# Patient Record
Sex: MALE | Race: WHITE | NOT HISPANIC OR LATINO | Employment: OTHER | URBAN - METROPOLITAN AREA
[De-identification: names, ages, dates, MRNs, and addresses within clinical notes are randomized per-mention and may not be internally consistent; named-entity substitution may affect disease eponyms.]

---

## 2022-03-31 ENCOUNTER — OFFICE VISIT (OUTPATIENT)
Dept: ORTHOPEDIC SURGERY | Facility: CLINIC | Age: 72
End: 2022-03-31

## 2022-03-31 VITALS — BODY MASS INDEX: 28.71 KG/M2 | TEMPERATURE: 96.7 F | HEIGHT: 72 IN | WEIGHT: 212 LBS

## 2022-03-31 DIAGNOSIS — R52 PAIN: Primary | ICD-10-CM

## 2022-03-31 DIAGNOSIS — M17.0 PRIMARY OSTEOARTHRITIS OF BOTH KNEES: ICD-10-CM

## 2022-03-31 PROCEDURE — 99204 OFFICE O/P NEW MOD 45 MIN: CPT | Performed by: ORTHOPAEDIC SURGERY

## 2022-03-31 PROCEDURE — 73562 X-RAY EXAM OF KNEE 3: CPT | Performed by: ORTHOPAEDIC SURGERY

## 2022-03-31 RX ORDER — AMLODIPINE BESYLATE 5 MG/1
5 TABLET ORAL DAILY
COMMUNITY
Start: 2021-12-27

## 2022-03-31 RX ORDER — MELOXICAM 15 MG/1
TABLET ORAL
Qty: 30 TABLET | Refills: 1 | Status: SHIPPED | OUTPATIENT
Start: 2022-03-31 | End: 2022-09-08 | Stop reason: HOSPADM

## 2022-03-31 NOTE — PROGRESS NOTES
"willowPatient: Everton Murguia  YOB: 1950 72 y.o. male  Medical Record Number: [id number]    Chief Complaints:   Chief Complaint   Patient presents with   • Left Knee - Establish Care   • Right Knee - Establish Care       History of Present Illness:Everton Murguia is a 72 y.o. male who presents with right greater than left knee pain which has been present for many years.  He has had previous steroid injections a few years ago and he states that he had essentially no relief from the steroid injections.  Unfortunately his pain is worsened.  He can only walk very short distances less than one half block.  He has a stabbing pain on the medial aspect of both knees.    Allergies: No Known Allergies    Medications:   Current Outpatient Medications   Medication Sig Dispense Refill   • amLODIPine (NORVASC) 5 MG tablet      • metFORMIN (GLUCOPHAGE) 500 MG tablet        No current facility-administered medications for this visit.         The following portions of the patient's history were reviewed and updated as appropriate: allergies, current medications, past family history, past medical history, past social history, past surgical history and problem list.    Review of Systems:   A 14 point review of systems was performed. All systems negative except pertinent positives/negative listed in HPI above    Physical Exam:   Vitals:    03/31/22 0905   Temp: 96.7 °F (35.9 °C)   Weight: 96.2 kg (212 lb)   Height: 182.9 cm (72\")       General: A and O x 3, ASA, NAD    SCLERA:    Normal    DENTITION:   Normal     Knee:  bilateral    ALIGNMENT:     Varus  ,   Patella  tracks  midline    GAIT:    Antalgic    SKIN:    No abnormality    RANGE OF MOTION:   5-110   DEG    STRENGTH:   4  / 5    LIGAMENTS:    No varus / valgus instability.   Negative  Lachman.    MENISCUS:     Negative   Li       DISTAL PULSES:    Paplable    DISTAL SENSATION :   Intact    LYMPHATICS:     No   lymphadenopathy    OTHER:          - Positive   " effusion      - Crepitance with ROM         Radiology:  Xrays 3views bilateral knees (ap,lateral, sunrise) were ordered and reviewed for evaluation of knee pain demonstrating advanced varus osteoarthritis with bone on bone articulation, subchondral cysts, and periarticular osteophytes.  The right is worse than the left.  There are no previous films for comparison.    Assessment/Plan: Severe right greater than left knee end-stage osteoarthritis.  Previous injections have not helped and he is diabetic so we would avoid any further steroid injections.  I will send him to physical therapy for quad and hip strengthening given his gait abnormalities.  I recommended the use a cane that he has at home in the interim.  He has severe end-stage arthritis of the likely need to proceed with knee replacement of the right knee first.  I am sending him back to physical therapy and I will recheck him in 6 weeks.  Also called in a prescription for meloxicam      Heriberto Rodríguez MD  3/31/2022

## 2022-05-12 ENCOUNTER — OFFICE VISIT (OUTPATIENT)
Dept: ORTHOPEDIC SURGERY | Facility: CLINIC | Age: 72
End: 2022-05-12

## 2022-05-12 VITALS — BODY MASS INDEX: 26.82 KG/M2 | TEMPERATURE: 96.4 F | WEIGHT: 209 LBS | HEIGHT: 74 IN | RESPIRATION RATE: 18 BRPM

## 2022-05-12 DIAGNOSIS — M17.11 PRIMARY OSTEOARTHRITIS OF RIGHT KNEE: Primary | ICD-10-CM

## 2022-05-12 DIAGNOSIS — E11.9 TYPE 2 DIABETES MELLITUS WITHOUT COMPLICATION, WITHOUT LONG-TERM CURRENT USE OF INSULIN: ICD-10-CM

## 2022-05-12 DIAGNOSIS — M17.12 PRIMARY OSTEOARTHRITIS OF LEFT KNEE: ICD-10-CM

## 2022-05-12 PROCEDURE — 99214 OFFICE O/P EST MOD 30 MIN: CPT | Performed by: ORTHOPAEDIC SURGERY

## 2022-05-12 RX ORDER — PREGABALIN 75 MG/1
150 CAPSULE ORAL ONCE
Status: CANCELLED | OUTPATIENT
Start: 2022-09-07 | End: 2022-05-12

## 2022-05-12 RX ORDER — CHLORHEXIDINE GLUCONATE 500 MG/1
CLOTH TOPICAL 2 TIMES DAILY
Status: CANCELLED | OUTPATIENT
Start: 2022-05-12

## 2022-05-12 RX ORDER — POVIDONE-IODINE 10 MG/ML
SOLUTION TOPICAL ONCE
Status: CANCELLED | OUTPATIENT
Start: 2022-09-07 | End: 2022-05-12

## 2022-05-12 RX ORDER — CEFAZOLIN SODIUM 2 G/100ML
2 INJECTION, SOLUTION INTRAVENOUS ONCE
Status: CANCELLED | OUTPATIENT
Start: 2022-09-07 | End: 2022-05-12

## 2022-05-12 RX ORDER — MELOXICAM 15 MG/1
15 TABLET ORAL ONCE
Status: CANCELLED | OUTPATIENT
Start: 2022-09-07 | End: 2022-05-12

## 2022-05-12 NOTE — PROGRESS NOTES
"Patient: Everton Murguia  YOB: 1950 72 y.o. male  Medical Record Number: [id number]    Chief Complaints:   Chief Complaint   Patient presents with   • Left Knee - Follow-up   • Right Knee - Follow-up       History of Present Illness:Everton Murguia is a 72 y.o. male who presents for follow-up of severe bilateral medial knee pain.  He rates it as a 10 out of 10.  Injections meloxicam therapy exercises have not helped to control his symptoms.  He is limited in basic activities of daily living and can only walk short distances.    Allergies: No Known Allergies    Medications:   Current Outpatient Medications   Medication Sig Dispense Refill   • amLODIPine (NORVASC) 5 MG tablet      • meloxicam (MOBIC) 15 MG tablet 1 PO Daily with food. 30 tablet 1   • metFORMIN (GLUCOPHAGE) 500 MG tablet        No current facility-administered medications for this visit.         The following portions of the patient's history were reviewed and updated as appropriate: allergies, current medications, past family history, past medical history, past social history, past surgical history and problem list.    Review of Systems:   A 14 point review of systems was performed. All systems negative except pertinent positives/negative listed in HPI above    Physical Exam:   Vitals:    05/12/22 1533   Resp: 18   Temp: 96.4 °F (35.8 °C)   Weight: 94.8 kg (209 lb)   Height: 188 cm (74\")       General: A and O x 3, ASA, NAD    SCLERA:    Normal    DENTITION:   Normal  Knee:  bilateral    ALIGNMENT:     Varus  ,   Patella  tracks  midline    GAIT:    Antalgic    SKIN:    No abnormality    RANGE OF MOTION:   5  -  110   DEG    STRENGTH:   4  / 5    LIGAMENTS:    No varus / valgus instability.   Negative  Lachman.    MENISCUS:     Negative   Li       DISTAL PULSES:    Paplable    DISTAL SENSATION :   Intact    LYMPHATICS:     No   lymphadenopathy    OTHER:          - Positive   effusion      - Crepitance with ROM       Radiology:  Xrays " 3views both knees (ap,lateral, sunrise) taken previously demonstratingadvanced varus osteoarthritis with bone on bone articulation, subchondral cysts, and periarticular osteophytes  todays xrays were compared to previous xrays and demonstrate no change    Assessment/Plan:  Severe bilateral knee varus osteoarthritis.  Injections have given him no relief.  I will have him return to therapy again.  Meloxicam is not working.  He is diabetic his last hemoglobin A1c was 5.  Would like to hold off any further injections given the lack of response and his diabetes.  Continuation of conservative management vs. TKA discussed.  The patient wishes to proceed with total knee replacement.  At this point the patient has failed the full compliment of conservative treatment and stating complete understanding of the risks/benefits/ anternatives wishes to proceed with surgical treatment.    Risk and benefits of surgery were reviewed.  Including, but not limited to, blood clots or pulmonary embolism, anesthesia risk, infection, fracture, skin/leg numbness, persistent pain/crepitance/popping/catching, failure of the implant, need for future surgeries, hematoma, possible nerve or blood vessel injury, need for transfusion, and potential risk of stroke,heart attack or death, among others.  The patient understands and wishes to proceed.     It was explained that if tissue has been repaired or reconstructed, there is also an increased chance of failure which may require further management.  Following the completion of the discussion, the patient expressed understanding of this planned course of care, all their questions were answered and consent will be obtained preoperatively.    Operative Plan: Left [employer] Total Knee Replacement an overnight staywith home health rehab

## 2022-05-16 PROBLEM — M17.12 PRIMARY OSTEOARTHRITIS OF LEFT KNEE: Status: ACTIVE | Noted: 2022-05-16

## 2022-06-07 ENCOUNTER — TREATMENT (OUTPATIENT)
Dept: PHYSICAL THERAPY | Facility: CLINIC | Age: 72
End: 2022-06-07

## 2022-06-07 DIAGNOSIS — Z74.09 IMPAIRED FUNCTIONAL MOBILITY AND ACTIVITY TOLERANCE: ICD-10-CM

## 2022-06-07 DIAGNOSIS — R26.9 GAIT DISTURBANCE: ICD-10-CM

## 2022-06-07 DIAGNOSIS — G89.29 CHRONIC PAIN OF LEFT KNEE: Primary | ICD-10-CM

## 2022-06-07 DIAGNOSIS — M17.12 PRIMARY OSTEOARTHRITIS OF LEFT KNEE: ICD-10-CM

## 2022-06-07 DIAGNOSIS — M25.562 CHRONIC PAIN OF LEFT KNEE: Primary | ICD-10-CM

## 2022-06-07 PROCEDURE — 97530 THERAPEUTIC ACTIVITIES: CPT | Performed by: PHYSICAL THERAPIST

## 2022-06-07 PROCEDURE — 97110 THERAPEUTIC EXERCISES: CPT | Performed by: PHYSICAL THERAPIST

## 2022-06-07 PROCEDURE — 97161 PT EVAL LOW COMPLEX 20 MIN: CPT | Performed by: PHYSICAL THERAPIST

## 2022-06-07 NOTE — PROGRESS NOTES
Physical Therapy Initial Evaluation and Plan of Care    Patient: Everton Murguia   : 1950  Diagnosis/ICD-10 Code:  Chronic pain of left knee [M25.562, G89.29]  Referring practitioner: Heriberto Rodríguez MD  Date of Initial Visit: 2022  Today's Date: 2022  Patient seen for 1 session         Visit Diagnoses:    ICD-10-CM ICD-9-CM   1. Chronic pain of left knee  M25.562 719.46    G89.29 338.29   2. Primary osteoarthritis of left knee  M17.12 715.16   3. Gait disturbance  R26.9 781.2   4. Impaired functional mobility and activity tolerance  Z74.09 V49.89         Subjective Questionnaire: LEFS: 40      Subjective Evaluation    History of Present Illness  Mechanism of injury: No specific EDY - B knees OA over time - now advanced.  To have TKA Left in September.  Tried meloxicam and injections in past.   Other med Hx - type II DM - on insulin      Patient Occupation: retired   Precautions and Work Restrictions: NAPain  Current pain ratin  At worst pain ratin  Location: medial knees  Quality: sharp  Relieving factors: change in position and rest  Aggravating factors: ambulation, stairs, squatting and prolonged positioning  Progression: worsening    Social Support  Lives in: multiple-level home  Lives with: spouse    Diagnostic Tests  X-ray: abnormal    Treatments  Previous treatment: medication and injection treatment  Current treatment: physical therapy  Patient Goals  Patient goals for therapy: decreased pain  Patient goal: prepare for TKA           Objective          Observations     Additional Knee Observation Details  B Genu varum; supinated left ankle    Tenderness   Left Knee   Tenderness in the medial joint line and medial retinaculum.     Right Knee   Tenderness in the medial joint line and medial retinaculum.     Active Range of Motion   Left Knee   Flexion: 123 degrees     Right Knee   Flexion: 123 degrees     Additional Active Range of Motion Details  Lacks full extension slightly both  knees    Patellar Mobility   Left Knee Hypomobile in the left medial, left lateral, left superior and left inferior patellar tendon(s).     Right Knee Hypomobile in the medial, lateral, superior and inferior patellar tendon(s).     Additional Patellar Mobility Details  Crepitus L>R    Strength/Myotome Testing     Left Hip   Planes of Motion   Flexion: 5  Abduction: 5 (seated)  Adduction: 5  External rotation: 5    Right Hip   Planes of Motion   Flexion: 5  Abduction: 5  Adduction: 5  External rotation: 5    Left Knee   Flexion: 5  Extension: 5    Right Knee   Flexion: 5  Extension: 5    Left Ankle/Foot   Dorsiflexion: 4  Inversion: 4+  Eversion: 4-    Right Ankle/Foot   Dorsiflexion: 4+  Inversion: 4+  Eversion: 4    Tests     Additional Tests Details  Tight B HS, gastroc; very tight B quad and hip flexors    Ambulation     Ambulation: Level Surfaces   Ambulation without assistive device: independent    Additional Level Surfaces Ambulation Details  Mod antalgia - increased lateral sway          Assessment & Plan     Assessment  Impairments: abnormal gait, abnormal or restricted ROM, activity intolerance, impaired physical strength, lacks appropriate home exercise program and pain with function  Functional Limitations: walking, standing and stooping  Assessment details: 71 yo M with Dx of end-stage OA B knees and plans for left TKA later this year. Presents with good strength and fair ROM at knee but with notable mm tightness and with moderate ankle weakness.  Could benefit from PT to improve flexibility in LEs and strength/stability in ankles for improvements in pain and function now and for improved success with eventual TKA.  Prognosis details: Good for stated goals    Goals  Plan Goals: STGs x 2 wks  1. Increasing knee ROM and flexibility and ankle strength for improved ADLs  2. Pain with ex and ADLs < 6/10  3. Review body mechanics and joint protection principles with ADLs  4. Ambulates level surface 300 ft and  4 inch steps with min to no antalgia    LTGs x 6 wks  1. Improved flexibility for decreased pain with ADLs  2. Improved ankle strength by 1/2 grade or >  3. Ambulates 5 min and 6-8 inch steps with min antalgia  4. Independent with HEP and joint protection principles    Plan  Therapy options: will be seen for skilled therapy services  Planned modality interventions: cryotherapy  Planned therapy interventions: neuromuscular re-education, therapeutic activities, stretching, strengthening, home exercise program, gait training and flexibility  Frequency: 1x week  Duration in weeks: 6  Treatment plan discussed with: patient        History # of Personal Factors and/or Comorbidities: MODERATE (1-2)  Examination of Body System(s): # of elements: LOW (1-2)  Clinical Presentation: STABLE   Clinical Decision Making: LOW       Timed:         Manual Therapy:    0     mins  03156;     Therapeutic Exercise:    18     mins  03966;     Neuromuscular Alicia:    0    mins  61052;    Therapeutic Activity:     12     mins  40679;     Gait Trainin     mins  43011;     Ultrasound:     0     mins  29868;    Ionto                               00    mins   79812  Self Care                       0     mins   65979  Canalith Repos    0     mins 11907      Un-Timed:  Electrical Stimulation:    0     mins  36847 ( );  Dry Needling     0     mins self-pay  Traction     0     mins 49708  Low Eval     25     Mins  40458  Mod Eval     0     Mins  24767  High Eval                       0     Mins  93943        Timed Treatment:   30   mins   Total Treatment:     55   mins          PT: Myriam Aquino PT     License Number: [id number]  Electronically signed by Myriam Aquino PT, 22, 7:38 AM EDT    Certification Period: 2022 thru 2022  I certify that the therapy services are furnished while this patient is under my care.  The services outlined above are required by this patient, and will be reviewed every   days.         Physician Signature:__________________________________________________    PHYSICIAN: Heriberto Rodríguez MD  NPI: [id number]                                      DATE:      Please sign and return via fax to [email or web address] . Thank you, [facility].

## 2022-06-15 ENCOUNTER — TREATMENT (OUTPATIENT)
Dept: PHYSICAL THERAPY | Facility: CLINIC | Age: 72
End: 2022-06-15

## 2022-06-15 DIAGNOSIS — Z74.09 IMPAIRED FUNCTIONAL MOBILITY AND ACTIVITY TOLERANCE: ICD-10-CM

## 2022-06-15 DIAGNOSIS — M25.562 CHRONIC PAIN OF LEFT KNEE: Primary | ICD-10-CM

## 2022-06-15 DIAGNOSIS — G89.29 CHRONIC PAIN OF LEFT KNEE: Primary | ICD-10-CM

## 2022-06-15 DIAGNOSIS — M17.12 PRIMARY OSTEOARTHRITIS OF LEFT KNEE: ICD-10-CM

## 2022-06-15 DIAGNOSIS — R26.9 GAIT DISTURBANCE: ICD-10-CM

## 2022-06-15 PROCEDURE — 97110 THERAPEUTIC EXERCISES: CPT | Performed by: PHYSICAL THERAPIST

## 2022-06-15 PROCEDURE — 97530 THERAPEUTIC ACTIVITIES: CPT | Performed by: PHYSICAL THERAPIST

## 2022-06-15 NOTE — PROGRESS NOTES
Physical Therapy Daily Progress Note      Visit # 2      Subjective   A little better.  Able to do ex.    Objective   See Exercise, Manual, and Modality Logs for complete treatment.       Assessment/Plan     Responded favorably to initial ex.  Significant ankle/foot weakness, L>R - patient mentioned that he had polio as a child.  Also reported LBP.  These factors can impact his knee both now and after surgery.     Add core strengthening           Manual Therapy:    0     mins  44281;  Therapeutic Exercise:    30     mins  60432;     Neuromuscular Alicia:    0    mins  02567;    Therapeutic Activity:     28     mins  86354;     Gait Trainin     mins  97327;     Ultrasound:     0     mins  45249;    Work Hardening           0      mins 16329  Iontophoresis               0   mins 39620  Estim   0 min 47492      Timed Treatment:   58   mins   Total Treatment:     58   mins    Myriam Aquino PT  Physical Therapist  [place] License #[id number]

## 2022-06-22 ENCOUNTER — TREATMENT (OUTPATIENT)
Dept: PHYSICAL THERAPY | Facility: CLINIC | Age: 72
End: 2022-06-22

## 2022-06-22 DIAGNOSIS — Z74.09 IMPAIRED FUNCTIONAL MOBILITY AND ACTIVITY TOLERANCE: ICD-10-CM

## 2022-06-22 DIAGNOSIS — G89.29 CHRONIC PAIN OF LEFT KNEE: Primary | ICD-10-CM

## 2022-06-22 DIAGNOSIS — M25.562 CHRONIC PAIN OF LEFT KNEE: Primary | ICD-10-CM

## 2022-06-22 DIAGNOSIS — R26.9 GAIT DISTURBANCE: ICD-10-CM

## 2022-06-22 DIAGNOSIS — M17.12 PRIMARY OSTEOARTHRITIS OF LEFT KNEE: ICD-10-CM

## 2022-06-22 PROCEDURE — 97110 THERAPEUTIC EXERCISES: CPT | Performed by: PHYSICAL THERAPIST

## 2022-06-22 PROCEDURE — 97530 THERAPEUTIC ACTIVITIES: CPT | Performed by: PHYSICAL THERAPIST

## 2022-06-22 NOTE — PROGRESS NOTES
Physical Therapy Daily Treatment Note      Visit # 3      Subjective   I'm doing better.    Objective   See Exercise, Manual, and Modality Logs for complete treatment.       Assessment/Plan    Responding well to current and new ex.  Left ankle still very weak.  Needs continued focus on flexibility of hip/knee mm and ankle strength.      F/u in 2 weeks - assess status with HEP and proceed accordingly regarding D/C or continuation of PT             Manual Therapy:    0     mins  33063;  Therapeutic Exercise:    32     mins  24464;     Neuromuscular Alicia:    0    mins  73336;    Therapeutic Activity:     18     mins  52641;           Timed Treatment:   50   mins   Total Treatment:     50   mins    Myriam Aquino, PT  Physical Therapist  [place] License #[id number]

## 2022-07-06 ENCOUNTER — TREATMENT (OUTPATIENT)
Dept: PHYSICAL THERAPY | Facility: CLINIC | Age: 72
End: 2022-07-06

## 2022-07-06 DIAGNOSIS — G89.29 CHRONIC PAIN OF LEFT KNEE: Primary | ICD-10-CM

## 2022-07-06 DIAGNOSIS — R26.9 GAIT DISTURBANCE: ICD-10-CM

## 2022-07-06 DIAGNOSIS — Z74.09 IMPAIRED FUNCTIONAL MOBILITY AND ACTIVITY TOLERANCE: ICD-10-CM

## 2022-07-06 DIAGNOSIS — M25.562 CHRONIC PAIN OF LEFT KNEE: Primary | ICD-10-CM

## 2022-07-06 DIAGNOSIS — M17.12 PRIMARY OSTEOARTHRITIS OF LEFT KNEE: ICD-10-CM

## 2022-07-06 PROCEDURE — 97530 THERAPEUTIC ACTIVITIES: CPT | Performed by: PHYSICAL THERAPIST

## 2022-07-06 PROCEDURE — 97110 THERAPEUTIC EXERCISES: CPT | Performed by: PHYSICAL THERAPIST

## 2022-07-06 NOTE — PROGRESS NOTES
Physical Therapy MD Note          7/6/2022    To:  Heriberto Rodríguez MD    Re: Everton Murguia  ________________________________________________________________    Mr. Everton Murguia, has attended 4 PT sessions.  Treatment has consisted of: there-ex/act, HEP, pt education     S: Mr. Everton Murguia states: doing OK with exercises.  Would like to just continue them at home          Subjective     Objective          Observations     Additional Knee Observation Details  B Genu varum; supinated left ankle    Tenderness   Left Knee   Tenderness in the medial joint line and medial retinaculum.     Right Knee   Tenderness in the medial joint line and medial retinaculum.     Additional Tenderness Details  mild    Active Range of Motion   Left Knee   Flexion: 127 degrees     Right Knee   Flexion: 127 degrees     Additional Active Range of Motion Details  Lacks full extension slightly both knees    Patellar Mobility   Left Knee Hypomobile in the left medial, left lateral, left superior and left inferior patellar tendon(s).     Right Knee Hypomobile in the medial, lateral, superior and inferior patellar tendon(s).     Additional Patellar Mobility Details  Crepitus L>R    Strength/Myotome Testing     Left Hip   Planes of Motion   Flexion: 5  Abduction: 5 (seated)  Adduction: 5  External rotation: 5    Right Hip   Planes of Motion   Flexion: 5  Abduction: 5  Adduction: 5  External rotation: 5    Left Knee   Flexion: 5  Extension: 5    Right Knee   Flexion: 5  Extension: 5    Left Ankle/Foot   Dorsiflexion: 4  Inversion: 4+  Eversion: 4-    Right Ankle/Foot   Dorsiflexion: 4+  Inversion: 4+  Eversion: 4    Tests     Additional Tests Details  Tight B HS, gastroc; very tight B quad and hip flexors    Ambulation     Ambulation: Level Surfaces   Ambulation without assistive device: independent    Additional Level Surfaces Ambulation Details  Mod antalgia - increased lateral sway      See Exercise, Manual, and Modality Logs for complete treatment.      LEFS 52 (40 at eval)    Assessment/Plan    Presents with improvements in flexibility, ADLs and pain.  Still with significant ankle weakness affecting his gait.  Is independent with a HEP and verbalizes desire to continue just with this.    To continue with HEP and return only prn             Manual Therapy:    0     mins  68975;  Therapeutic Exercise:    28     mins  49435;     Neuromuscular Alicia:    0    mins  93510;    Therapeutic Activity:     13     mins  67275;     Gait Trainin     mins  76871;     Ultrasound:     0     mins  68403;    Work Hardening           0      mins 22112  E-stim                0   mins 79011    Timed Treatment:   41   mins   Total Treatment:     41   mins    Myriam Aqiuno, AXEL  Physical Therapist  [place] #[id number]

## 2022-08-18 ENCOUNTER — PRE-ADMISSION TESTING (OUTPATIENT)
Dept: PREADMISSION TESTING | Facility: HOSPITAL | Age: 72
End: 2022-08-18

## 2022-08-18 VITALS
HEIGHT: 73 IN | OXYGEN SATURATION: 96 % | RESPIRATION RATE: 18 BRPM | DIASTOLIC BLOOD PRESSURE: 82 MMHG | HEART RATE: 61 BPM | BODY MASS INDEX: 27.53 KG/M2 | WEIGHT: 207.7 LBS | TEMPERATURE: 98.2 F | SYSTOLIC BLOOD PRESSURE: 159 MMHG

## 2022-08-18 DIAGNOSIS — E11.9 TYPE 2 DIABETES MELLITUS WITHOUT COMPLICATION, WITHOUT LONG-TERM CURRENT USE OF INSULIN: ICD-10-CM

## 2022-08-18 DIAGNOSIS — M17.12 PRIMARY OSTEOARTHRITIS OF LEFT KNEE: ICD-10-CM

## 2022-08-18 LAB
ANION GAP SERPL CALCULATED.3IONS-SCNC: 12 MMOL/L (ref 5–15)
BUN SERPL-MCNC: 13 MG/DL (ref 8–23)
BUN/CREAT SERPL: 20.6 (ref 7–25)
CALCIUM SPEC-SCNC: 9.5 MG/DL (ref 8.6–10.5)
CHLORIDE SERPL-SCNC: 104 MMOL/L (ref 98–107)
CO2 SERPL-SCNC: 24 MMOL/L (ref 22–29)
CREAT SERPL-MCNC: 0.63 MG/DL (ref 0.76–1.27)
DEPRECATED RDW RBC AUTO: 41.4 FL (ref 37–54)
EGFRCR SERPLBLD CKD-EPI 2021: 100.4 ML/MIN/1.73
ERYTHROCYTE [DISTWIDTH] IN BLOOD BY AUTOMATED COUNT: 13 % (ref 12.3–15.4)
GLUCOSE SERPL-MCNC: 154 MG/DL (ref 65–99)
HBA1C MFR BLD: 6.1 % (ref 4.8–5.6)
HCT VFR BLD AUTO: 42.5 % (ref 37.5–51)
HGB BLD-MCNC: 14.6 G/DL (ref 13–17.7)
MCH RBC QN AUTO: 30.2 PG (ref 26.6–33)
MCHC RBC AUTO-ENTMCNC: 34.4 G/DL (ref 31.5–35.7)
MCV RBC AUTO: 88 FL (ref 79–97)
PLATELET # BLD AUTO: 184 10*3/MM3 (ref 140–450)
PMV BLD AUTO: 10.7 FL (ref 6–12)
POTASSIUM SERPL-SCNC: 3.9 MMOL/L (ref 3.5–5.2)
QT INTERVAL: 450 MS
RBC # BLD AUTO: 4.83 10*6/MM3 (ref 4.14–5.8)
SODIUM SERPL-SCNC: 140 MMOL/L (ref 136–145)
WBC NRBC COR # BLD: 4.3 10*3/MM3 (ref 3.4–10.8)

## 2022-08-18 PROCEDURE — 93005 ELECTROCARDIOGRAM TRACING: CPT

## 2022-08-18 PROCEDURE — 80048 BASIC METABOLIC PNL TOTAL CA: CPT

## 2022-08-18 PROCEDURE — 93010 ELECTROCARDIOGRAM REPORT: CPT | Performed by: INTERNAL MEDICINE

## 2022-08-18 PROCEDURE — 36415 COLL VENOUS BLD VENIPUNCTURE: CPT

## 2022-08-18 PROCEDURE — 83036 HEMOGLOBIN GLYCOSYLATED A1C: CPT

## 2022-08-18 PROCEDURE — 85027 COMPLETE CBC AUTOMATED: CPT

## 2022-08-18 RX ORDER — CHLORHEXIDINE GLUCONATE 500 MG/1
CLOTH TOPICAL 2 TIMES DAILY
Status: ACTIVE | OUTPATIENT
Start: 2022-08-18

## 2022-08-18 ASSESSMENT — KOOS JR
KOOS JR SCORE: 14
KOOS JR SCORE: 52.465

## 2022-08-18 NOTE — DISCHARGE INSTRUCTIONS
Take the following medications the morning of surgery:  AMLODIPINE    If you are on prescription narcotic pain medication to control your pain you may also take that medication the morning of surgery.    General Instructions:  Do not eat solid food after midnight the night before surgery.  You may drink clear liquids day of surgery but must stop at least one hour before your hospital arrival time.  It is beneficial for you to have a clear drink that contains carbohydrates the day of surgery.  We suggest a 12 to 20 ounce bottle of Gatorade or Powerade for non-diabetic patients or a 12 to 20 ounce bottle of G2 or Powerade Zero for diabetic patients. (Pediatric patients, are not advised to drink a 12 to 20 ounce carbohydrate drink)    Clear liquids are liquids you can see through.  Nothing red in color.     Plain water                               Sports drinks  Sodas                                   Gelatin (Jell-O)  Fruit juices without pulp such as white grape juice and apple juice  Popsicles that contain no fruit or yogurt  Tea or coffee (no cream or milk added)  Gatorade / Powerade  G2 / Powerade Zero    Infants may have breast milk up to four hours before surgery.  Infants drinking formula may drink formula up to six hours before surgery.   Patients who avoid smoking, chewing tobacco and alcohol for 4 weeks prior to surgery have a reduced risk of post-operative complications.  Quit smoking as many days before surgery as you can.  Do not smoke, use chewing tobacco or drink alcohol the day of surgery.   If applicable bring your C-PAP/ BI-PAP machine.  Bring any papers given to you in the doctor’s office.  Wear clean comfortable clothes.  Do not wear contact lenses, false eyelashes or make-up.  Bring a case for your glasses.   Bring crutches or walker if applicable.  Remove all piercings.  Leave jewelry and any other valuables at home.  Hair extensions with metal clips must be removed prior to surgery.  The  Pre-Admission Testing nurse will instruct you to bring medications if unable to obtain an accurate list in Pre-Admission Testing.        If you were given a blood bank ID arm band remember to bring it with you the day of surgery.    Preventing a Surgical Site Infection:  For 2 to 3 days before surgery, avoid shaving with a razor because the razor can irritate skin and make it easier to develop an infection.    Any areas of open skin can increase the risk of a post-operative wound infection by allowing bacteria to enter and travel throughout the body.  Notify your surgeon if you have any skin wounds / rashes even if it is not near the expected surgical site.  The area will need assessed to determine if surgery should be delayed until it is healed.  The night prior to surgery shower using a fresh bar of anti-bacterial soap (such as Dial) and clean washcloth.  Sleep in a clean bed with clean clothing.  Do not allow pets to sleep with you.  Shower on the morning of surgery using a fresh bar of anti-bacterial soap (such as Dial) and clean washcloth.  Dry with a clean towel and dress in clean clothing.  Ask your surgeon if you will be receiving antibiotics prior to surgery.  Make sure you, your family, and all healthcare providers clean their hands with soap and water or an alcohol based hand  before caring for you or your wound.    Day of surgery: 9/7/2022 PT WILL BE NOTIFIED OF ARRIVAL TIME  Your arrival time is approximately two hours before your scheduled surgery time.  Upon arrival, a Pre-op nurse and Anesthesiologist will review your health history, obtain vital signs, and answer questions you may have.  The only belongings needed at this time will be a list of your home medications and if applicable your C-PAP/BI-PAP machine.  A Pre-op nurse will start an IV and you may receive medication in preparation for surgery, including something to help you relax.     Please be aware that surgery does come with  discomfort.  We want to make every effort to control your discomfort so please discuss any uncontrolled symptoms with your nurse.   Your doctor will most likely have prescribed pain medications.      If you are going home after surgery you will receive individualized written care instructions before being discharged.  A responsible adult must drive you to and from the hospital on the day of your surgery and stay with you for 24 hours.  Discharge prescriptions can be filled by the hospital pharmacy during regular pharmacy hours.  If you are having surgery late in the day/evening your prescription may be e-prescribed to your pharmacy.  Please verify your pharmacy hours or chose a 24 hour pharmacy to avoid not having access to your prescription because your pharmacy has closed for the day.    If you are staying overnight following surgery, you will be transported to your hospital room following the recovery period.  [facility] has all private rooms.    If you have any questions please call Pre-Admission Testing at [phone].  Deductibles and co-payments are collected on the day of service. Please be prepared to pay the required co-pay, deductible or deposit on the day of service as defined by your plan.    Patient Education for Self-Quarantine Process    Following your COVID testing, we strongly recommend that you wear a mask when you are with other people and practice social distancing.   Limit your activities to only required outings.  Wash your hands with soap and water frequently for at least 20 seconds.   Avoid touching your eyes, nose and mouth with unwashed hands.  Do not share anything - utensils, drinking glasses, food from the same bowl.   Sanitize household surfaces daily. Include all high touch areas (door handles, light switches, phones, countertops, etc.)    Call your surgeon immediately if you experience any of the following symptoms:  Sore Throat  Shortness of Breath or difficulty  breathing  Cough  Chills  Body soreness or muscle pain  Headache  Fever  New loss of taste or smell  Do not arrive for your surgery ill.  Your procedure will need to be rescheduled to another time.  You will need to call your physician before the day of surgery to avoid any unnecessary exposure to hospital staff as well as other patients.   CHLORHEXIDINE CLOTH INSTRUCTIONS  The morning of surgery follow these instructions using the Chlorhexidine cloths you've been given.  These steps reduce bacteria on the body.  Do not use the cloths near your eyes, ears mouth, genitalia or on open wounds.  Throw the cloths away after use but do not try to flush them down a toilet.      Open and remove one cloth at a time from the package.    Leave the cloth unfolded and begin the bathing.  Massage the skin with the cloths using gentle pressure to remove bacteria.  Do not scrub harshly.   Follow the steps below with one 2% CHG cloth per area (6 total cloths).  One cloth for neck, shoulders and chest.  One cloth for both arms, hands, fingers and underarms (do underarms last).  One cloth for the abdomen followed by groin.  One cloth for right leg and foot including between the toes.  One cloth for left leg and foot including between the toes.  The last cloth is to be used for the back of the neck, back and buttocks.    Allow the CHG to air dry 3 minutes on the skin which will give it time to work and decrease the chance of irritation.  The skin may feel sticky until it is dry.  Do not rinse with water or any other liquid or you will lose the beneficial effects of the CHG.  If mild skin irritation occurs, do rinse the skin to remove the CHG.  Report this to the nurse at time of admission.  Do not apply lotions, creams, ointments, deodorants or perfumes after using the clothes. Dress in clean clothes before coming to the hospital.

## 2022-09-01 ENCOUNTER — OFFICE VISIT (OUTPATIENT)
Dept: ORTHOPEDIC SURGERY | Facility: CLINIC | Age: 72
End: 2022-09-01

## 2022-09-01 VITALS
DIASTOLIC BLOOD PRESSURE: 82 MMHG | WEIGHT: 206 LBS | SYSTOLIC BLOOD PRESSURE: 138 MMHG | BODY MASS INDEX: 27.3 KG/M2 | TEMPERATURE: 97.8 F | HEIGHT: 73 IN

## 2022-09-01 DIAGNOSIS — M17.12 PRIMARY OSTEOARTHRITIS OF LEFT KNEE: Primary | ICD-10-CM

## 2022-09-01 PROCEDURE — S0260 H&P FOR SURGERY: HCPCS | Performed by: NURSE PRACTITIONER

## 2022-09-01 PROCEDURE — 77077 JOINT SURVEY SINGLE VIEW: CPT | Performed by: ORTHOPAEDIC SURGERY

## 2022-09-01 NOTE — H&P (VIEW-ONLY)
Patient: Everton Murguia    Date of Admission: 9/7/2022    YOB: 1950    Medical Record Number: [id number]    Admitting Physician: Dr. Heriberto Rodríguez    Reason for Admission: End Stage Left Knee OA    History of Present Illness: 72 y.o. male presents with severe end stage knee osteoarthritis which has not been responsive to the full compliment of conservative measures. Despite conservative attempts, there is still severe, constant activity limiting pain. Given the severity of the pain, the patient has elected to proceed with knee replacement.    Allergies: No Known Allergies      Current Medications:  Home Medications:    Current Outpatient Medications on File Prior to Visit   Medication Sig   • amLODIPine (NORVASC) 5 MG tablet Take 5 mg by mouth Daily.   • meloxicam (MOBIC) 15 MG tablet 1 PO Daily with food. (Patient taking differently: 1 PO Daily with food./HOLDING FOR SURGERY)   • metFORMIN (GLUCOPHAGE) 500 MG tablet Take 500 mg by mouth Daily With Breakfast.     Current Facility-Administered Medications on File Prior to Visit   Medication   • Chlorhexidine Gluconate Cloth 2 % pads     PRN Meds:.    PMH:     Past Medical History:   Diagnosis Date   • Arthritis    • Hypertension    • Knee pain, bilateral    • Polio    • Type 2 diabetes mellitus, with long-term current use of insulin (HCC)        PF/Surg/Soc Hx:   No past surgical history on file.     Social History     Occupational History   • Not on file   Tobacco Use   • Smoking status: Never Smoker   • Smokeless tobacco: Never Used   Vaping Use   • Vaping Use: Never used   Substance and Sexual Activity   • Alcohol use: Never   • Drug use: Never   • Sexual activity: Not on file      Social History     Social History Narrative   • Not on file        Family History   Problem Relation Age of Onset   • Malig Hyperthermia Neg Hx          Review of Systems:   A 14 point review of systems was performed, pertinent positives discussed above, all other systems are  "negative    Physical Exam: 72 y.o. male  Vital Signs :   Vitals:    09/01/22 1311   BP: 138/82   BP Location: Left arm   Patient Position: Sitting   Cuff Size: Large Adult   Temp: 97.8 °F (36.6 °C)   TempSrc: Temporal   Weight: 93.4 kg (206 lb)   Height: 185.4 cm (72.99\")     General: Alert and Oriented x 3, No acute distress.  Psych: mood and affect appropriate; recent and remote memory intact  Eyes: conjunctiva clear; pupils equally round and reactive, sclera nonicteric  CV: no peripheral edema  Resp: normal respiratory effort  Skin: no rashes or wounds; normal turgor  Musculosketetal; pain and crepitance with knee range of motion  Vascular: palpable distal pulses    Xrays:  -3 views (AP, lateral, and sunrise) were reviewed demonstrating end-stage OA with bone on bone articulation.  -A full length AP xray was ordered and reviewed today for purposes of operative alignment demonstrating end stage arthritic findings. There are no previous full length films for review    Assessment:  End-stage Left knee osteoarthritis. Conservative measures have failed.      Plan:  The plan is to proceed with Left Total Knee Replacement. The patient voiced understanding of the risks, benefits, and alternative forms of treatment that were discussed with Dr Rodríguez at the time of scheduling. 23     Ebonie Cortez, APRN  9/1/2022        "

## 2022-09-01 NOTE — H&P
Patient: Everton Murguia    Date of Admission: 9/7/2022    YOB: 1950    Medical Record Number: [id number]    Admitting Physician: Dr. Heriberto Rodríguez    Reason for Admission: End Stage Left Knee OA    History of Present Illness: 72 y.o. male presents with severe end stage knee osteoarthritis which has not been responsive to the full compliment of conservative measures. Despite conservative attempts, there is still severe, constant activity limiting pain. Given the severity of the pain, the patient has elected to proceed with knee replacement.    Allergies: No Known Allergies      Current Medications:  Home Medications:    Current Outpatient Medications on File Prior to Visit   Medication Sig   • amLODIPine (NORVASC) 5 MG tablet Take 5 mg by mouth Daily.   • meloxicam (MOBIC) 15 MG tablet 1 PO Daily with food. (Patient taking differently: 1 PO Daily with food./HOLDING FOR SURGERY)   • metFORMIN (GLUCOPHAGE) 500 MG tablet Take 500 mg by mouth Daily With Breakfast.     Current Facility-Administered Medications on File Prior to Visit   Medication   • Chlorhexidine Gluconate Cloth 2 % pads     PRN Meds:.    PMH:     Past Medical History:   Diagnosis Date   • Arthritis    • Hypertension    • Knee pain, bilateral    • Polio    • Type 2 diabetes mellitus, with long-term current use of insulin (HCC)        PF/Surg/Soc Hx:   No past surgical history on file.     Social History     Occupational History   • Not on file   Tobacco Use   • Smoking status: Never Smoker   • Smokeless tobacco: Never Used   Vaping Use   • Vaping Use: Never used   Substance and Sexual Activity   • Alcohol use: Never   • Drug use: Never   • Sexual activity: Not on file      Social History     Social History Narrative   • Not on file        Family History   Problem Relation Age of Onset   • Malig Hyperthermia Neg Hx          Review of Systems:   A 14 point review of systems was performed, pertinent positives discussed above, all other systems are  "negative    Physical Exam: 72 y.o. male  Vital Signs :   Vitals:    09/01/22 1311   BP: 138/82   BP Location: Left arm   Patient Position: Sitting   Cuff Size: Large Adult   Temp: 97.8 °F (36.6 °C)   TempSrc: Temporal   Weight: 93.4 kg (206 lb)   Height: 185.4 cm (72.99\")     General: Alert and Oriented x 3, No acute distress.  Psych: mood and affect appropriate; recent and remote memory intact  Eyes: conjunctiva clear; pupils equally round and reactive, sclera nonicteric  CV: no peripheral edema  Resp: normal respiratory effort  Skin: no rashes or wounds; normal turgor  Musculosketetal; pain and crepitance with knee range of motion  Vascular: palpable distal pulses    Xrays:  -3 views (AP, lateral, and sunrise) were reviewed demonstrating end-stage OA with bone on bone articulation.  -A full length AP xray was ordered and reviewed today for purposes of operative alignment demonstrating end stage arthritic findings. There are no previous full length films for review    Assessment:  End-stage Left knee osteoarthritis. Conservative measures have failed.      Plan:  The plan is to proceed with Left Total Knee Replacement. The patient voiced understanding of the risks, benefits, and alternative forms of treatment that were discussed with Dr Rodríguez at the time of scheduling. 23     Ebonie Cortez, APRN  9/1/2022        "

## 2022-09-06 ENCOUNTER — APPOINTMENT (OUTPATIENT)
Dept: LAB | Facility: HOSPITAL | Age: 72
End: 2022-09-06

## 2022-09-07 ENCOUNTER — ANESTHESIA EVENT (OUTPATIENT)
Dept: PERIOP | Facility: HOSPITAL | Age: 72
End: 2022-09-07

## 2022-09-07 ENCOUNTER — APPOINTMENT (OUTPATIENT)
Dept: GENERAL RADIOLOGY | Facility: HOSPITAL | Age: 72
End: 2022-09-07

## 2022-09-07 ENCOUNTER — ANESTHESIA (OUTPATIENT)
Dept: PERIOP | Facility: HOSPITAL | Age: 72
End: 2022-09-07

## 2022-09-07 ENCOUNTER — HOSPITAL ENCOUNTER (OUTPATIENT)
Facility: HOSPITAL | Age: 72
Discharge: HOME HEALTH | End: 2022-09-08
Attending: ORTHOPAEDIC SURGERY | Admitting: ORTHOPAEDIC SURGERY

## 2022-09-07 DIAGNOSIS — M17.12 PRIMARY OSTEOARTHRITIS OF LEFT KNEE: ICD-10-CM

## 2022-09-07 DIAGNOSIS — Z96.652 S/P TKR (TOTAL KNEE REPLACEMENT), LEFT: Primary | ICD-10-CM

## 2022-09-07 LAB
GLUCOSE BLDC GLUCOMTR-MCNC: 131 MG/DL (ref 70–130)
GLUCOSE BLDC GLUCOMTR-MCNC: 192 MG/DL (ref 70–130)

## 2022-09-07 PROCEDURE — 73560 X-RAY EXAM OF KNEE 1 OR 2: CPT

## 2022-09-07 PROCEDURE — C1776 JOINT DEVICE (IMPLANTABLE): HCPCS | Performed by: ORTHOPAEDIC SURGERY

## 2022-09-07 PROCEDURE — G0378 HOSPITAL OBSERVATION PER HR: HCPCS

## 2022-09-07 PROCEDURE — 82962 GLUCOSE BLOOD TEST: CPT

## 2022-09-07 PROCEDURE — 25010000002 ONDANSETRON PER 1 MG: Performed by: NURSE ANESTHETIST, CERTIFIED REGISTERED

## 2022-09-07 PROCEDURE — 25010000002 FENTANYL CITRATE (PF) 50 MCG/ML SOLUTION: Performed by: NURSE ANESTHETIST, CERTIFIED REGISTERED

## 2022-09-07 PROCEDURE — 25010000002 MIDAZOLAM PER 1 MG: Performed by: ANESTHESIOLOGY

## 2022-09-07 PROCEDURE — 97161 PT EVAL LOW COMPLEX 20 MIN: CPT

## 2022-09-07 PROCEDURE — 27447 TOTAL KNEE ARTHROPLASTY: CPT | Performed by: ORTHOPAEDIC SURGERY

## 2022-09-07 PROCEDURE — 63710000001 PREGABALIN 75 MG CAPSULE: Performed by: ORTHOPAEDIC SURGERY

## 2022-09-07 PROCEDURE — 25010000002 DEXAMETHASONE PER 1 MG: Performed by: ANESTHESIOLOGY

## 2022-09-07 PROCEDURE — 25010000002 CEFAZOLIN IN DEXTROSE 2-4 GM/100ML-% SOLUTION: Performed by: ORTHOPAEDIC SURGERY

## 2022-09-07 PROCEDURE — 25010000002 FENTANYL CITRATE (PF) 50 MCG/ML SOLUTION: Performed by: ANESTHESIOLOGY

## 2022-09-07 PROCEDURE — 20985 CPTR-ASST DIR MS PX: CPT | Performed by: ORTHOPAEDIC SURGERY

## 2022-09-07 PROCEDURE — 25010000002 ROPIVACAINE PER 1 MG: Performed by: ANESTHESIOLOGY

## 2022-09-07 PROCEDURE — A9270 NON-COVERED ITEM OR SERVICE: HCPCS | Performed by: NURSE PRACTITIONER

## 2022-09-07 PROCEDURE — C1713 ANCHOR/SCREW BN/BN,TIS/BN: HCPCS | Performed by: ORTHOPAEDIC SURGERY

## 2022-09-07 PROCEDURE — 25010000002 PROPOFOL 10 MG/ML EMULSION: Performed by: NURSE ANESTHETIST, CERTIFIED REGISTERED

## 2022-09-07 PROCEDURE — 63710000001 MELOXICAM 15 MG TABLET: Performed by: ORTHOPAEDIC SURGERY

## 2022-09-07 PROCEDURE — 25010000002 CEFAZOLIN IN DEXTROSE 2-4 GM/100ML-% SOLUTION: Performed by: NURSE PRACTITIONER

## 2022-09-07 PROCEDURE — C9290 INJ, BUPIVACAINE LIPOSOME: HCPCS | Performed by: ORTHOPAEDIC SURGERY

## 2022-09-07 PROCEDURE — 76942 ECHO GUIDE FOR BIOPSY: CPT | Performed by: ORTHOPAEDIC SURGERY

## 2022-09-07 PROCEDURE — 0 BUPIVACAINE LIPOSOME 1.3 % SUSPENSION 20 ML VIAL: Performed by: ORTHOPAEDIC SURGERY

## 2022-09-07 PROCEDURE — A9270 NON-COVERED ITEM OR SERVICE: HCPCS | Performed by: ORTHOPAEDIC SURGERY

## 2022-09-07 PROCEDURE — 97116 GAIT TRAINING THERAPY: CPT

## 2022-09-07 PROCEDURE — 25010000002 VANCOMYCIN 10 G RECONSTITUTED SOLUTION: Performed by: ORTHOPAEDIC SURGERY

## 2022-09-07 PROCEDURE — 63710000001 AMLODIPINE 5 MG TABLET: Performed by: NURSE PRACTITIONER

## 2022-09-07 DEVICE — LEGION CRUCIATE RETAINING OXINIUM                                    FEMORAL SIZE 6 LEFT
Type: IMPLANTABLE DEVICE | Site: KNEE | Status: FUNCTIONAL
Brand: LEGION

## 2022-09-07 DEVICE — PALACOS® R IS A FAST-CURING, RADIOPAQUE, POLY(METHYL METHACRYLATE)-BASED BONE CEMENT.PALACOS ® R CONTAINS THE X-RAY CONTRAST MEDIUM ZIRCONIUM DIOXIDE. TO IMPROVE VISIBILITY IN THE SURGICAL FIELD PALACOS ® R HAS BEEN COLOURED WITH CHLOROPHYLL (E141). THE BONE CEMENT IS PREPARED DIRECTLY BEFORE USE BY MIXING A POLYMER POWDER COMPONENT WITH A LIQUID MONOMER COMPONENT. A DUCTILE DOUGH FORMS WHICH CURES WITHIN A FEW MINUTES.
Type: IMPLANTABLE DEVICE | Site: KNEE | Status: FUNCTIONAL
Brand: PALACOS®

## 2022-09-07 DEVICE — GENESIS II NON-POROUS TIBIAL                                    BASEPLATE SIZE 6 LT
Type: IMPLANTABLE DEVICE | Site: KNEE | Status: FUNCTIONAL
Brand: GENESIS II

## 2022-09-07 DEVICE — KNOTLESS TISSUE CONTROL DEVICE, UNDYED UNIDIRECTIONAL (ANTIBACTERIAL) SYNTHETIC ABSORBABLE DEVICE
Type: IMPLANTABLE DEVICE | Site: KNEE | Status: FUNCTIONAL
Brand: STRATAFIX

## 2022-09-07 DEVICE — VIOLET ANTIBACTERIAL POLYDIOXANONE, KNOTLESS TISSUE CONTROL DEVICE
Type: IMPLANTABLE DEVICE | Site: KNEE | Status: FUNCTIONAL
Brand: STRATAFIX

## 2022-09-07 DEVICE — GENESIS II BICONVEX PATELLA 32MM
Type: IMPLANTABLE DEVICE | Site: KNEE | Status: FUNCTIONAL
Brand: GENESIS II

## 2022-09-07 DEVICE — LEGION CRUCIATE RETAINING HIGH                                    FLEX HIGHLY CROSS LINKED                                    POLYETHYLENE SIZE 5-6 11MM
Type: IMPLANTABLE DEVICE | Site: KNEE | Status: FUNCTIONAL
Brand: LEGION

## 2022-09-07 DEVICE — IMPLANTABLE DEVICE: Type: IMPLANTABLE DEVICE | Site: KNEE | Status: FUNCTIONAL

## 2022-09-07 RX ORDER — SODIUM CHLORIDE, SODIUM LACTATE, POTASSIUM CHLORIDE, CALCIUM CHLORIDE 600; 310; 30; 20 MG/100ML; MG/100ML; MG/100ML; MG/100ML
9 INJECTION, SOLUTION INTRAVENOUS CONTINUOUS
Status: DISCONTINUED | OUTPATIENT
Start: 2022-09-07 | End: 2022-09-08 | Stop reason: HOSPADM

## 2022-09-07 RX ORDER — DIPHENHYDRAMINE HCL 25 MG
25 CAPSULE ORAL
Status: DISCONTINUED | OUTPATIENT
Start: 2022-09-07 | End: 2022-09-07 | Stop reason: HOSPADM

## 2022-09-07 RX ORDER — FLUMAZENIL 0.1 MG/ML
0.2 INJECTION INTRAVENOUS AS NEEDED
Status: DISCONTINUED | OUTPATIENT
Start: 2022-09-07 | End: 2022-09-07 | Stop reason: HOSPADM

## 2022-09-07 RX ORDER — MAGNESIUM HYDROXIDE 1200 MG/15ML
LIQUID ORAL AS NEEDED
Status: DISCONTINUED | OUTPATIENT
Start: 2022-09-07 | End: 2022-09-07 | Stop reason: HOSPADM

## 2022-09-07 RX ORDER — HYDROCODONE BITARTRATE AND ACETAMINOPHEN 7.5; 325 MG/1; MG/1
1 TABLET ORAL EVERY 4 HOURS PRN
Status: DISCONTINUED | OUTPATIENT
Start: 2022-09-07 | End: 2022-09-08 | Stop reason: HOSPADM

## 2022-09-07 RX ORDER — ROCURONIUM BROMIDE 10 MG/ML
INJECTION, SOLUTION INTRAVENOUS AS NEEDED
Status: DISCONTINUED | OUTPATIENT
Start: 2022-09-07 | End: 2022-09-07 | Stop reason: SURG

## 2022-09-07 RX ORDER — IBUPROFEN 600 MG/1
600 TABLET ORAL ONCE AS NEEDED
Status: DISCONTINUED | OUTPATIENT
Start: 2022-09-07 | End: 2022-09-07 | Stop reason: HOSPADM

## 2022-09-07 RX ORDER — HYDROCODONE BITARTRATE AND ACETAMINOPHEN 7.5; 325 MG/1; MG/1
2 TABLET ORAL EVERY 4 HOURS PRN
Status: DISCONTINUED | OUTPATIENT
Start: 2022-09-07 | End: 2022-09-08 | Stop reason: HOSPADM

## 2022-09-07 RX ORDER — PROPOFOL 10 MG/ML
VIAL (ML) INTRAVENOUS AS NEEDED
Status: DISCONTINUED | OUTPATIENT
Start: 2022-09-07 | End: 2022-09-07 | Stop reason: SURG

## 2022-09-07 RX ORDER — MIDAZOLAM HYDROCHLORIDE 1 MG/ML
1 INJECTION INTRAMUSCULAR; INTRAVENOUS ONCE
Status: COMPLETED | OUTPATIENT
Start: 2022-09-07 | End: 2022-09-07

## 2022-09-07 RX ORDER — GLYCOPYRROLATE 0.2 MG/ML
INJECTION INTRAMUSCULAR; INTRAVENOUS AS NEEDED
Status: DISCONTINUED | OUTPATIENT
Start: 2022-09-07 | End: 2022-09-07 | Stop reason: SURG

## 2022-09-07 RX ORDER — OXYCODONE AND ACETAMINOPHEN 7.5; 325 MG/1; MG/1
1 TABLET ORAL EVERY 4 HOURS PRN
Status: DISCONTINUED | OUTPATIENT
Start: 2022-09-07 | End: 2022-09-07 | Stop reason: HOSPADM

## 2022-09-07 RX ORDER — SODIUM CHLORIDE 0.9 % (FLUSH) 0.9 %
3-10 SYRINGE (ML) INJECTION AS NEEDED
Status: DISCONTINUED | OUTPATIENT
Start: 2022-09-07 | End: 2022-09-07 | Stop reason: HOSPADM

## 2022-09-07 RX ORDER — ONDANSETRON 4 MG/1
4 TABLET, FILM COATED ORAL EVERY 8 HOURS PRN
Qty: 10 TABLET | Refills: 0 | Status: SHIPPED | OUTPATIENT
Start: 2022-09-07

## 2022-09-07 RX ORDER — ROPIVACAINE HYDROCHLORIDE 5 MG/ML
INJECTION, SOLUTION EPIDURAL; INFILTRATION; PERINEURAL
Status: COMPLETED | OUTPATIENT
Start: 2022-09-07 | End: 2022-09-07

## 2022-09-07 RX ORDER — MELOXICAM 15 MG/1
15 TABLET ORAL ONCE
Status: COMPLETED | OUTPATIENT
Start: 2022-09-07 | End: 2022-09-07

## 2022-09-07 RX ORDER — ONDANSETRON 2 MG/ML
INJECTION INTRAMUSCULAR; INTRAVENOUS AS NEEDED
Status: DISCONTINUED | OUTPATIENT
Start: 2022-09-07 | End: 2022-09-07 | Stop reason: SURG

## 2022-09-07 RX ORDER — PROMETHAZINE HYDROCHLORIDE 25 MG/1
25 SUPPOSITORY RECTAL ONCE AS NEEDED
Status: DISCONTINUED | OUTPATIENT
Start: 2022-09-07 | End: 2022-09-07 | Stop reason: HOSPADM

## 2022-09-07 RX ORDER — HYDRALAZINE HYDROCHLORIDE 20 MG/ML
5 INJECTION INTRAMUSCULAR; INTRAVENOUS
Status: DISCONTINUED | OUTPATIENT
Start: 2022-09-07 | End: 2022-09-07 | Stop reason: HOSPADM

## 2022-09-07 RX ORDER — TRANEXAMIC ACID 100 MG/ML
INJECTION, SOLUTION INTRAVENOUS AS NEEDED
Status: DISCONTINUED | OUTPATIENT
Start: 2022-09-07 | End: 2022-09-07 | Stop reason: SURG

## 2022-09-07 RX ORDER — CEFAZOLIN SODIUM 2 G/100ML
2 INJECTION, SOLUTION INTRAVENOUS ONCE
Status: COMPLETED | OUTPATIENT
Start: 2022-09-07 | End: 2022-09-07

## 2022-09-07 RX ORDER — LIDOCAINE HYDROCHLORIDE 20 MG/ML
INJECTION, SOLUTION INFILTRATION; PERINEURAL AS NEEDED
Status: DISCONTINUED | OUTPATIENT
Start: 2022-09-07 | End: 2022-09-07 | Stop reason: SURG

## 2022-09-07 RX ORDER — PREGABALIN 75 MG/1
150 CAPSULE ORAL ONCE
Status: COMPLETED | OUTPATIENT
Start: 2022-09-07 | End: 2022-09-07

## 2022-09-07 RX ORDER — MELOXICAM 15 MG/1
15 TABLET ORAL DAILY
Qty: 14 TABLET | Refills: 0 | Status: SHIPPED | OUTPATIENT
Start: 2022-09-07 | End: 2022-09-23 | Stop reason: HOSPADM

## 2022-09-07 RX ORDER — FENTANYL CITRATE 50 UG/ML
INJECTION, SOLUTION INTRAMUSCULAR; INTRAVENOUS AS NEEDED
Status: DISCONTINUED | OUTPATIENT
Start: 2022-09-07 | End: 2022-09-07 | Stop reason: SURG

## 2022-09-07 RX ORDER — SODIUM CHLORIDE 0.9 % (FLUSH) 0.9 %
3 SYRINGE (ML) INJECTION EVERY 12 HOURS SCHEDULED
Status: DISCONTINUED | OUTPATIENT
Start: 2022-09-07 | End: 2022-09-07 | Stop reason: HOSPADM

## 2022-09-07 RX ORDER — ONDANSETRON 4 MG/1
4 TABLET, FILM COATED ORAL EVERY 6 HOURS PRN
Status: DISCONTINUED | OUTPATIENT
Start: 2022-09-07 | End: 2022-09-08 | Stop reason: HOSPADM

## 2022-09-07 RX ORDER — PANTOPRAZOLE SODIUM 40 MG/1
40 TABLET, DELAYED RELEASE ORAL DAILY
Qty: 14 TABLET | Refills: 0 | Status: ON HOLD | OUTPATIENT
Start: 2022-09-07 | End: 2022-09-23

## 2022-09-07 RX ORDER — LIDOCAINE HYDROCHLORIDE 10 MG/ML
0.5 INJECTION, SOLUTION EPIDURAL; INFILTRATION; INTRACAUDAL; PERINEURAL ONCE AS NEEDED
Status: DISCONTINUED | OUTPATIENT
Start: 2022-09-07 | End: 2022-09-07 | Stop reason: HOSPADM

## 2022-09-07 RX ORDER — UREA 10 %
1 LOTION (ML) TOPICAL NIGHTLY PRN
Status: DISCONTINUED | OUTPATIENT
Start: 2022-09-07 | End: 2022-09-08 | Stop reason: HOSPADM

## 2022-09-07 RX ORDER — AMLODIPINE BESYLATE 5 MG/1
5 TABLET ORAL DAILY
Status: DISCONTINUED | OUTPATIENT
Start: 2022-09-07 | End: 2022-09-08 | Stop reason: HOSPADM

## 2022-09-07 RX ORDER — FENTANYL CITRATE 50 UG/ML
50 INJECTION, SOLUTION INTRAMUSCULAR; INTRAVENOUS
Status: DISCONTINUED | OUTPATIENT
Start: 2022-09-07 | End: 2022-09-07 | Stop reason: HOSPADM

## 2022-09-07 RX ORDER — PROMETHAZINE HYDROCHLORIDE 12.5 MG/1
12.5 TABLET ORAL EVERY 4 HOURS PRN
Status: DISCONTINUED | OUTPATIENT
Start: 2022-09-07 | End: 2022-09-08 | Stop reason: HOSPADM

## 2022-09-07 RX ORDER — DEXAMETHASONE SODIUM PHOSPHATE 4 MG/ML
INJECTION, SOLUTION INTRA-ARTICULAR; INTRALESIONAL; INTRAMUSCULAR; INTRAVENOUS; SOFT TISSUE
Status: COMPLETED | OUTPATIENT
Start: 2022-09-07 | End: 2022-09-07

## 2022-09-07 RX ORDER — POLYETHYLENE GLYCOL 3350 17 G/17G
17 POWDER, FOR SOLUTION ORAL 2 TIMES DAILY
Qty: 238 G | Refills: 0 | Status: SHIPPED | OUTPATIENT
Start: 2022-09-07 | End: 2022-09-15

## 2022-09-07 RX ORDER — ASPIRIN 81 MG/1
TABLET ORAL
Qty: 60 TABLET | Refills: 0 | Status: SHIPPED | OUTPATIENT
Start: 2022-09-07 | End: 2022-10-20

## 2022-09-07 RX ORDER — ACETAMINOPHEN 325 MG/1
650 TABLET ORAL EVERY 6 HOURS PRN
Status: DISCONTINUED | OUTPATIENT
Start: 2022-09-07 | End: 2022-09-08 | Stop reason: HOSPADM

## 2022-09-07 RX ORDER — CEFAZOLIN SODIUM 2 G/100ML
2 INJECTION, SOLUTION INTRAVENOUS EVERY 8 HOURS
Status: DISPENSED | OUTPATIENT
Start: 2022-09-07 | End: 2022-09-08

## 2022-09-07 RX ORDER — DIPHENHYDRAMINE HYDROCHLORIDE 50 MG/ML
12.5 INJECTION INTRAMUSCULAR; INTRAVENOUS
Status: DISCONTINUED | OUTPATIENT
Start: 2022-09-07 | End: 2022-09-07 | Stop reason: HOSPADM

## 2022-09-07 RX ORDER — IPRATROPIUM BROMIDE AND ALBUTEROL SULFATE 2.5; .5 MG/3ML; MG/3ML
3 SOLUTION RESPIRATORY (INHALATION) ONCE AS NEEDED
Status: DISCONTINUED | OUTPATIENT
Start: 2022-09-07 | End: 2022-09-07 | Stop reason: HOSPADM

## 2022-09-07 RX ORDER — ONDANSETRON 2 MG/ML
4 INJECTION INTRAMUSCULAR; INTRAVENOUS ONCE AS NEEDED
Status: DISCONTINUED | OUTPATIENT
Start: 2022-09-07 | End: 2022-09-07 | Stop reason: HOSPADM

## 2022-09-07 RX ORDER — EPHEDRINE SULFATE 50 MG/ML
5 INJECTION, SOLUTION INTRAVENOUS ONCE AS NEEDED
Status: DISCONTINUED | OUTPATIENT
Start: 2022-09-07 | End: 2022-09-07 | Stop reason: HOSPADM

## 2022-09-07 RX ORDER — HYDROCODONE BITARTRATE AND ACETAMINOPHEN 7.5; 325 MG/1; MG/1
1-2 TABLET ORAL EVERY 4 HOURS PRN
Qty: 60 TABLET | Refills: 0 | Status: SHIPPED | OUTPATIENT
Start: 2022-09-07

## 2022-09-07 RX ORDER — ONDANSETRON 2 MG/ML
4 INJECTION INTRAMUSCULAR; INTRAVENOUS ONCE AS NEEDED
Status: DISCONTINUED | OUTPATIENT
Start: 2022-09-07 | End: 2022-09-08 | Stop reason: HOSPADM

## 2022-09-07 RX ORDER — POVIDONE-IODINE 10 MG/ML
SOLUTION TOPICAL ONCE
Status: COMPLETED | OUTPATIENT
Start: 2022-09-07 | End: 2022-09-07

## 2022-09-07 RX ORDER — HYDROMORPHONE HYDROCHLORIDE 1 MG/ML
0.5 INJECTION, SOLUTION INTRAMUSCULAR; INTRAVENOUS; SUBCUTANEOUS
Status: DISCONTINUED | OUTPATIENT
Start: 2022-09-07 | End: 2022-09-07 | Stop reason: HOSPADM

## 2022-09-07 RX ORDER — FENTANYL CITRATE 50 UG/ML
50 INJECTION, SOLUTION INTRAMUSCULAR; INTRAVENOUS ONCE
Status: COMPLETED | OUTPATIENT
Start: 2022-09-07 | End: 2022-09-07

## 2022-09-07 RX ORDER — PROMETHAZINE HYDROCHLORIDE 25 MG/1
25 TABLET ORAL ONCE AS NEEDED
Status: DISCONTINUED | OUTPATIENT
Start: 2022-09-07 | End: 2022-09-07 | Stop reason: HOSPADM

## 2022-09-07 RX ORDER — ASPIRIN 81 MG/1
81 TABLET ORAL EVERY 12 HOURS SCHEDULED
Status: DISCONTINUED | OUTPATIENT
Start: 2022-09-08 | End: 2022-09-08 | Stop reason: HOSPADM

## 2022-09-07 RX ORDER — LABETALOL HYDROCHLORIDE 5 MG/ML
5 INJECTION, SOLUTION INTRAVENOUS
Status: DISCONTINUED | OUTPATIENT
Start: 2022-09-07 | End: 2022-09-07 | Stop reason: HOSPADM

## 2022-09-07 RX ORDER — NALOXONE HCL 0.4 MG/ML
0.2 VIAL (ML) INJECTION AS NEEDED
Status: DISCONTINUED | OUTPATIENT
Start: 2022-09-07 | End: 2022-09-07 | Stop reason: HOSPADM

## 2022-09-07 RX ORDER — HYDROCODONE BITARTRATE AND ACETAMINOPHEN 7.5; 325 MG/1; MG/1
1 TABLET ORAL ONCE AS NEEDED
Status: DISCONTINUED | OUTPATIENT
Start: 2022-09-07 | End: 2022-09-07 | Stop reason: HOSPADM

## 2022-09-07 RX ORDER — ACETAMINOPHEN 10 MG/ML
INJECTION, SOLUTION INTRAVENOUS AS NEEDED
Status: DISCONTINUED | OUTPATIENT
Start: 2022-09-07 | End: 2022-09-07 | Stop reason: SURG

## 2022-09-07 RX ORDER — FAMOTIDINE 10 MG/ML
20 INJECTION, SOLUTION INTRAVENOUS ONCE
Status: COMPLETED | OUTPATIENT
Start: 2022-09-07 | End: 2022-09-07

## 2022-09-07 RX ADMIN — PREGABALIN 150 MG: 75 CAPSULE ORAL at 07:40

## 2022-09-07 RX ADMIN — SODIUM CHLORIDE, POTASSIUM CHLORIDE, SODIUM LACTATE AND CALCIUM CHLORIDE 9 ML/HR: 600; 310; 30; 20 INJECTION, SOLUTION INTRAVENOUS at 09:26

## 2022-09-07 RX ADMIN — ROCURONIUM BROMIDE 50 MG: 50 INJECTION INTRAVENOUS at 09:38

## 2022-09-07 RX ADMIN — POVIDONE-IODINE: 10 SOLUTION TOPICAL at 07:50

## 2022-09-07 RX ADMIN — AMLODIPINE BESYLATE 5 MG: 5 TABLET ORAL at 17:00

## 2022-09-07 RX ADMIN — FAMOTIDINE 20 MG: 10 INJECTION, SOLUTION INTRAVENOUS at 08:11

## 2022-09-07 RX ADMIN — SODIUM CHLORIDE, POTASSIUM CHLORIDE, SODIUM LACTATE AND CALCIUM CHLORIDE: 600; 310; 30; 20 INJECTION, SOLUTION INTRAVENOUS at 10:32

## 2022-09-07 RX ADMIN — LIDOCAINE HYDROCHLORIDE 100 MG: 20 INJECTION, SOLUTION INFILTRATION; PERINEURAL at 09:38

## 2022-09-07 RX ADMIN — FENTANYL CITRATE 50 MCG: 50 INJECTION INTRAMUSCULAR; INTRAVENOUS at 07:50

## 2022-09-07 RX ADMIN — CEFAZOLIN SODIUM 2 G: 2 INJECTION, SOLUTION INTRAVENOUS at 09:26

## 2022-09-07 RX ADMIN — SUGAMMADEX 200 MG: 100 INJECTION, SOLUTION INTRAVENOUS at 11:17

## 2022-09-07 RX ADMIN — ACETAMINOPHEN 1000 MG: 10 INJECTION, SOLUTION INTRAVENOUS at 09:45

## 2022-09-07 RX ADMIN — MELOXICAM 15 MG: 15 TABLET ORAL at 07:40

## 2022-09-07 RX ADMIN — PROPOFOL 200 MG: 10 INJECTION, EMULSION INTRAVENOUS at 09:38

## 2022-09-07 RX ADMIN — Medication 3 ML: at 09:26

## 2022-09-07 RX ADMIN — CEFAZOLIN SODIUM 2 G: 2 INJECTION, SOLUTION INTRAVENOUS at 23:43

## 2022-09-07 RX ADMIN — VANCOMYCIN HYDROCHLORIDE 1500 MG: 10 INJECTION, POWDER, LYOPHILIZED, FOR SOLUTION INTRAVENOUS at 08:33

## 2022-09-07 RX ADMIN — GLYCOPYRROLATE 0.2 MG: 0.2 INJECTION INTRAMUSCULAR; INTRAVENOUS at 09:34

## 2022-09-07 RX ADMIN — ROPIVACAINE HYDROCHLORIDE 20 ML: 5 INJECTION, SOLUTION EPIDURAL; INFILTRATION; PERINEURAL at 08:00

## 2022-09-07 RX ADMIN — TRANEXAMIC ACID 1000 MG: 1 INJECTION, SOLUTION INTRAVENOUS at 11:01

## 2022-09-07 RX ADMIN — PROPOFOL 160 MCG/KG/MIN: 10 INJECTION, EMULSION INTRAVENOUS at 09:45

## 2022-09-07 RX ADMIN — DEXAMETHASONE SODIUM PHOSPHATE 8 MG: 4 INJECTION, SOLUTION INTRAMUSCULAR; INTRAVENOUS at 09:45

## 2022-09-07 RX ADMIN — SODIUM CHLORIDE, POTASSIUM CHLORIDE, SODIUM LACTATE AND CALCIUM CHLORIDE 500 ML: 600; 310; 30; 20 INJECTION, SOLUTION INTRAVENOUS at 08:14

## 2022-09-07 RX ADMIN — FENTANYL CITRATE 100 MCG: 0.05 INJECTION, SOLUTION INTRAMUSCULAR; INTRAVENOUS at 09:34

## 2022-09-07 RX ADMIN — DEXAMETHASONE SODIUM PHOSPHATE 4 MG: 4 INJECTION, SOLUTION INTRAMUSCULAR; INTRAVENOUS at 08:00

## 2022-09-07 RX ADMIN — MIDAZOLAM HYDROCHLORIDE 1 MG: 1 INJECTION, SOLUTION INTRAMUSCULAR; INTRAVENOUS at 07:50

## 2022-09-07 RX ADMIN — ONDANSETRON 4 MG: 2 INJECTION INTRAMUSCULAR; INTRAVENOUS at 11:13

## 2022-09-07 NOTE — PLAN OF CARE
Goal Outcome Evaluation:  Plan of Care Reviewed With: patient           Outcome Evaluation: Pt is a 73 yo M POD 0 L TKA. Pt lives with his wife and reports independence at BL with no AD, but has a rwx. Pt has 2 CRISTOPHER with 2 steps inside and reports 2 recent falls d/t tripping. Pt presents to PT with impaired strength, endurance, and ROM limiting overall mobility. Pt sitting UIC on arrival and stood with mod I. Ambulated 70ft with rwx and mod I - cues for fluid movement of rwx with steps but overall tolerated well. Pt returned to room and completed TKA exercises in chair - noted 90 degrees of L knee flexion with heel slides. Pt educated on icing and elevation and left with all needs met. Pt will continue to benefit from skilled PT to address functional deficits and improve overall mobility. Anticipate D/C home with HHPT.

## 2022-09-07 NOTE — THERAPY EVALUATION
Patient Name: Everton Murguia  : 1950    MRN: [id number]                              Today's Date: 2022       Admit Date: 2022    Visit Dx:     ICD-10-CM ICD-9-CM   1. S/P TKR (total knee replacement), left  Z96.652 V43.65   2. Primary osteoarthritis of left knee  M17.12 715.16     Patient Active Problem List   Diagnosis   • Primary osteoarthritis of right knee   • Primary osteoarthritis of left knee     Past Medical History:   Diagnosis Date   • Arthritis    • Hypertension    • Knee pain, bilateral    • Polio    • Type 2 diabetes mellitus, with long-term current use of insulin (Formerly [facility])      History reviewed. No pertinent surgical history.   General Information     [facility] Name 22 1639          Physical Therapy Time and Intention    Document Type evaluation  -     Mode of Treatment individual therapy;physical therapy  -     Row Name 22 163          General Information    Patient Profile Reviewed yes  -     Prior Level of Function independent:;gait;transfer;bed mobility  -     Existing Precautions/Restrictions no known precautions/restrictions  -     Barriers to Rehab none identified  -     Row Name 22 1639          Living Environment    People in Home spouse  -     Row Name 22 1639          Home Main Entrance    Number of Stairs, Main Entrance two  -[facility] Name 22 1639          Stairs Within Home, Primary    Number of Stairs, Within Home, Primary two  -[facility] Name 22 163          Cognition    Orientation Status (Cognition) oriented x 4  -     Row Name 22 1639          Safety Issues, Functional Mobility    Impairments Affecting Function (Mobility) endurance/activity tolerance;strength;pain;range of motion (ROM)  -           User Key  (r) = Recorded By, (t) = Taken By, (c) = Cosigned By    Initials Name Provider Type     Ada Galicia PT Physical Therapist               Mobility     Row Name 22 1639          Bed Mobility     Supine-Sit Martin (Bed Mobility) not tested  -     Comment, (Bed Mobility) NT - UIC  -[facility] Name 09/07/22 1639          Sit-Stand Transfer    Sit-Stand Martin (Transfers) modified independence  -     Assistive Device (Sit-Stand Transfers) walker, front-wheeled  -[facility] Name 09/07/22 1639          Gait/Stairs (Locomotion)    Martin Level (Gait) modified independence  -     Assistive Device (Gait) walker, front-wheeled  -     Distance in Feet (Gait) 70ft  -     Deviations/Abnormal Patterns (Gait) antalgic;dunia decreased;gait speed decreased;weight shifting decreased;stride length decreased  -     Bilateral Gait Deviations forward flexed posture;heel strike decreased  -     Martin Level (Stairs) not tested  -     Comment, (Gait/Stairs) Tolerated gait well, distance limited 2/2 fatigue  -[facility] Name 09/07/22 1639          Mobility    Extremity Weight-bearing Status left lower extremity  -     Left Lower Extremity (Weight-bearing Status) weight-bearing as tolerated (WBAT)  -           User Key  (r) = Recorded By, (t) = Taken By, (c) = Cosigned By    Initials Name Provider Type     Ada Galicia, PT Physical Therapist               Obj/Interventions     [facility] Name 09/07/22 1640          Range of Motion Comprehensive    General Range of Motion lower extremity range of motion deficits identified  -     Comment, General Range of Motion Expected post-op ROM deficits, L knee flexion grossly 90 degrees with heel slides  -[facility] Name 09/07/22 1640          Strength Comprehensive (MMT)    General Manual Muscle Testing (MMT) Assessment lower extremity strength deficits identified  -     Comment, General Manual Muscle Testing (MMT) Assessment Expected post-op strength deficits, BLE grossly 4/5  -[facility] Name 09/07/22 1640          Motor Skills    Therapeutic Exercise --  10 reps TKA protocol  -[facility] Name 09/07/22 1640          Balance    Balance  Assessment sitting static balance;sitting dynamic balance;standing static balance;standing dynamic balance  -     Static Sitting Balance independent  -     Dynamic Sitting Balance independent  -     Position, Sitting Balance unsupported;sitting in chair  -     Static Standing Balance modified independence  -     Dynamic Standing Balance modified independence  -     Position/Device Used, Standing Balance walker, front-wheeled  -[facility] Name 09/07/22 1640          Sensory Assessment (Somatosensory)    Sensory Assessment (Somatosensory) LE sensation intact  -           User Key  (r) = Recorded By, (t) = Taken By, (c) = Cosigned By    Initials Name Provider Type     Ada Galicia, PT Physical Therapist               Goals/Plan     [facility] Name 09/07/22 1646          Bed Mobility Goal 1 (PT)    Activity/Assistive Device (Bed Mobility Goal 1, PT) bed mobility activities, all  -     [place] Level/Cues Needed (Bed Mobility Goal 1, PT) independent  -     Time Frame (Bed Mobility Goal 1, PT) 3 days  -[facility] Name 09/07/22 1646          Transfer Goal 1 (PT)    Activity/Assistive Device (Transfer Goal 1, PT) transfers, all  -     [place] Level/Cues Needed (Transfer Goal 1, PT) independent  -     Time Frame (Transfer Goal 1, PT) 3 days  -[facility] Name 09/07/22 1646          Gait Training Goal 1 (PT)    Activity/Assistive Device (Gait Training Goal 1, PT) gait (walking locomotion)  -     [place] Level (Gait Training Goal 1, PT) independent  -     Distance (Gait Training Goal 1, PT) 200ft  -     Time Frame (Gait Training Goal 1, PT) 3 days  -[facility] Name 09/07/22 1646          Stairs Goal 1 (PT)    Activity/Assistive Device (Stairs Goal 1, PT) stairs, all skills  -     [place] Level/Cues Needed (Stairs Goal 1, PT) standby assist  -     Number of Stairs (Stairs Goal 1, PT) 2  -BH     Time Frame (Stairs Goal 1, PT) 3 days  -[facility] Name 09/07/22 1646          Therapy  Assessment/Plan (PT)    Planned Therapy Interventions (PT) balance training;bed mobility training;gait training;home exercise program;patient/family education;ROM (range of motion);stair training;strengthening;transfer training  -           User Key  (r) = Recorded By, (t) = Taken By, (c) = Cosigned By    Initials Name Provider Type     Ada Galicia, PT Physical Therapist               Clinical Impression     [facility] Name 09/07/22 1641          Pain    Pretreatment Pain Rating 0/10 - no pain  -     Posttreatment Pain Rating 0/10 - no pain  -[facility] Name 09/07/22 1641          Plan of Care Review    Plan of Care Reviewed With patient  -     Outcome Evaluation Pt is a 71 yo M POD 0 L TKA. Pt lives with his wife and reports independence at BL with no AD, but has a rwx. Pt has 2 CRISTOPHER with 2 steps inside and reports 2 recent falls d/t tripping. Pt presents to PT with impaired strength, endurance, and ROM limiting overall mobility. Pt sitting UIC on arrival and stood with mod I. Ambulated 70ft with rwx and mod I - cues for fluid movement of rwx with steps but overall tolerated well. Pt returned to room and completed TKA exercises in chair - noted 90 degrees of L knee flexion with heel slides. Pt educated on icing and elevation and left with all needs met. Pt will continue to benefit from skilled PT to address functional deficits and improve overall mobility. Anticipate D/C home with HHPT.  -[facility] Name 09/07/22 1641          Therapy Assessment/Plan (PT)    Patient/Family Therapy Goals Statement (PT) Return to [facility]  -     Rehab Potential (PT) good, to achieve stated therapy goals  [facility]     Criteria for Skilled Interventions Met (PT) yes  -     Therapy Frequency (PT) daily  -[facility] Name 09/07/22 1641          Vital Signs    O2 Delivery Pre Treatment room air  -     O2 Delivery Intra Treatment room air  -     O2 Delivery Post Treatment room air  -[facility] Name 09/07/22 1641          Positioning and  Restraints    Pre-Treatment Position sitting in chair/recliner  -BH     Post Treatment Position chair  -BH     In Chair reclined;call light within reach;encouraged to call for assist;exit alarm on  -           User Key  (r) = Recorded By, (t) = Taken By, (c) = Cosigned By    Initials Name Provider Type     Ada Galicia PT Physical Therapist               Outcome Measures     Row Name 09/07/22 1646          How much help from another person do you currently need...    Turning from your back to your side while in flat bed without using bedrails? 3  -BH     Moving from lying on back to sitting on the side of a flat bed without bedrails? 3  -BH     Moving to and from a bed to a chair (including a wheelchair)? 4  -BH     Standing up from a chair using your arms (e.g., wheelchair, bedside chair)? 4  -BH     Climbing 3-5 steps with a railing? 3  -BH     To walk in hospital room? 3  -     AM-PAC 6 Clicks Score (PT) 20  -     Highest level of mobility 6 --> Walked 10 steps or more  -     Row Name 09/07/22 1646          Functional Assessment    Outcome Measure Options AM-PAC 6 Clicks Basic Mobility (PT)  -           User Key  (r) = Recorded By, (t) = Taken By, (c) = Cosigned By    Initials Name Provider Type     Ada Galicia PT Physical Therapist                             Physical Therapy Education                 Title: PT OT SLP Therapies (Done)     Topic: Physical Therapy (Done)     Point: Mobility training (Done)     Learning Progress Summary           Patient Acceptance, E,TB,D, VU,NR by  at 9/7/2022 1646                   Point: Home exercise program (Done)     Learning Progress Summary           Patient Acceptance, E,TB,D, VU,NR by  at 9/7/2022 1646                   Point: Body mechanics (Done)     Learning Progress Summary           Patient Acceptance, E,TB,D, VU,NR by  at 9/7/2022 1646                   Point: Precautions (Done)     Learning Progress Summary           Patient  Acceptance, E,TB,D, VU,NR by  at 9/7/2022 1646                               User Key     Initials Effective Dates Name Provider Type Discipline     04/08/22 -  Ada Galicia, PT Physical Therapist PT              PT Recommendation and Plan  Planned Therapy Interventions (PT): balance training, bed mobility training, gait training, home exercise program, patient/family education, ROM (range of motion), stair training, strengthening, transfer training  Plan of Care Reviewed With: patient  Outcome Evaluation: Pt is a 71 yo M POD 0 L TKA. Pt lives with his wife and reports independence at BL with no AD, but has a rwx. Pt has 2 CRISTOPHER with 2 steps inside and reports 2 recent falls d/t tripping. Pt presents to PT with impaired strength, endurance, and ROM limiting overall mobility. Pt sitting UIC on arrival and stood with mod I. Ambulated 70ft with rwx and mod I - cues for fluid movement of rwx with steps but overall tolerated well. Pt returned to room and completed TKA exercises in chair - noted 90 degrees of L knee flexion with heel slides. Pt educated on icing and elevation and left with all needs met. Pt will continue to benefit from skilled PT to address functional deficits and improve overall mobility. Anticipate D/C home with HHPT.     Time Calculation:    PT Charges     Row Name 09/07/22 1647             Time Calculation    Start Time 1622  -      Stop Time 1636  -      Time Calculation (min) 14 min  -      PT Received On 09/07/22  -      PT - Next Appointment 09/08/22  -      PT Goal Re-Cert Due Date 09/10/22  -              Time Calculation- PT    Total Timed Code Minutes- PT 12 minute(s)  -              Timed Charges    50351 - Gait Training Minutes  8  -      77548 - PT Therapeutic Activity Minutes 4  -              Untimed Charges    PT Eval/Re-eval Minutes 5  -              Total Minutes    Timed Charges Total Minutes 12  -      Untimed Charges Total Minutes 5  -       Total  Minutes 17  -            User Key  (r) = Recorded By, (t) = Taken By, (c) = Cosigned By    Initials Name Provider Type     Ada Galicia, PT Physical Therapist              Therapy Charges for Today     Code Description Service Date Service Provider Modifiers Qty    39498620791  GAIT TRAINING EA 15 MIN 9/7/2022 Ada Galicia, PT GP 1    24428152043 HC PT EVAL LOW COMPLEXITY 2 9/7/2022 Ada Galicia, PT GP 1          PT G-Codes  Outcome Measure Options: AM-PAC 6 Clicks Basic Mobility (PT)  AM-PAC 6 Clicks Score (PT): 20    Ada Galicia PT  9/7/2022

## 2022-09-07 NOTE — ANESTHESIA PREPROCEDURE EVALUATION
Anesthesia Evaluation     NPO Solid Status: > 8 hours  NPO Liquid Status: > 8 hours           Airway   Mallampati: II  TM distance: >3 FB  No difficulty expected  Dental      Pulmonary    Cardiovascular     (+) hypertension,       Neuro/Psych  GI/Hepatic/Renal/Endo    (+)   diabetes mellitus,     Musculoskeletal     Abdominal    Substance History      OB/GYN          Other   arthritis,                      Anesthesia Plan    ASA 3     general with block     intravenous induction     Anesthetic plan, risks, benefits, and alternatives have been provided, discussed and informed consent has been obtained with: patient.    Plan discussed with CRNA.        CODE STATUS:

## 2022-09-07 NOTE — PROGRESS NOTES
Discharge Planning Assessment  [facility]     Patient Name: Everton Murguia  MRN: [id number]  Today's Date: 9/7/2022    Admit Date: 9/7/2022     Discharge Needs Assessment     Row Name 09/07/22 1435       Living Environment    People in Home spouse    Name(s) of People in Home Jessica Murguia spouse    Current Living Arrangements home    Primary Care Provided by self    Provides Primary Care For no one    Family Caregiver if Needed spouse    Family Caregiver Names Jessica Murguia spouse    Quality of Family Relationships supportive;involved;helpful    Able to Return to Prior Arrangements yes       Resource/Environmental Concerns    Resource/Environmental Concerns none       Transition Planning    Patient/Family Anticipates Transition to home with help/services    Patient/Family Anticipated Services at Transition home health care       Discharge Needs Assessment    Equipment Currently Used at Home walker, rolling    Concerns to be Addressed discharge planning               Discharge Plan     Row Name 09/07/22 1436       Plan    Plan Home with spouse and [facility]    Plan Comments Spoke with pt at bedside and pt's wife Jessica via phone to screen for DCP/needs.   Pt confirmed that he  resides at [facility] address with his spouse and [place].  Pt stated that he wouldlike to use [facility] [place].  Referral place [facility] to follow for orders.  Pt's wife agreeable to plan.  Pt does plan to DC home tomorrow 9/8 and pt's wife can transport pt home.              Continued Care and Services - Admitted Since 9/7/2022     Home Medical Care     Service Provider Request Status Selected Services Address Phone Fax Patient Preferred     [facility]  Accepted N/A [address] [id number] [phone] --              Expected Discharge Date and Time     Expected Discharge Date Expected Discharge Time    Sep 8, 2022          Demographic Summary     Row Name 09/07/22 1433       General  Information    Admission Type observation    Referral Source admission list    Reason for Consult discharge planning               Functional Status     Row Name 09/07/22 1437       Functional Status    Usual Activity Tolerance good    Current Activity Tolerance fair       Functional Status, IADL    Medications independent    Meal Preparation independent    Housekeeping independent    Laundry independent    Shopping independent       Mental Status Summary    Recent Changes in Mental Status/Cognitive Functioning no changes               Psychosocial    No documentation.                Abuse/Neglect    No documentation.                Legal    No documentation.                Substance Abuse    No documentation.                Patient Forms    No documentation.                   RAMANA Mittal

## 2022-09-07 NOTE — ANESTHESIA PROCEDURE NOTES
Airway  Urgency: elective    Date/Time: 9/7/2022 9:43 AM  Airway not difficult    General Information and Staff    Patient location during procedure: OR  Anesthesiologist: Joss Jimenez MD  CRNA/CAA: Nina Noel CRNA    Indications and Patient Condition  Indications for airway management: airway protection    Preoxygenated: yes  MILS maintained throughout  Mask difficulty assessment: 2 - vent by mask + OA or adjuvant +/- NMBA    Final Airway Details  Final airway type: endotracheal airway      Successful airway: ETT  Cuffed: yes   Successful intubation technique: direct laryngoscopy  Facilitating devices/methods: intubating stylet and cricoid pressure  Endotracheal tube insertion site: oral  Blade: Gurrola  Blade size: 2  ETT size (mm): 8.0  Cormack-Lehane Classification: grade I - full view of glottis  Placement verified by: chest auscultation and capnometry   Cuff volume (mL): 10  Measured from: teeth  ETT/EBT  to teeth (cm): 21  Number of attempts at approach: 1  Assessment: lips, teeth, and gum same as pre-op and atraumatic intubation

## 2022-09-07 NOTE — ANESTHESIA POSTPROCEDURE EVALUATION
Patient: Everton Murguia    Procedure Summary     Date: 09/07/22 Room / Location:  [facility] [address] [place] [address] [facility]    Anesthesia Start: 0927 Anesthesia Stop: 1200    Procedure: TOTAL KNEE ARTHROPLASTY WITH CORI ROBOT (Left Knee) Diagnosis:       Primary osteoarthritis of left knee      (Primary osteoarthritis of left knee [M17.12])    Surgeons: Heriberto Rodríguez MD Provider: Joss Jimenez MD    Anesthesia Type: general with block ASA Status: 3          Anesthesia Type: general with block    Vitals  Vitals Value Taken Time   /74 09/07/22 1245   Temp 36.4 °C (97.5 °F) 09/07/22 1155   Pulse 47 09/07/22 1257   Resp 12 09/07/22 1245   SpO2 97 % 09/07/22 1257   Vitals shown include unvalidated device data.        Post Anesthesia Care and Evaluation    Patient location during evaluation: PACU  Patient participation: complete - patient participated  Level of consciousness: awake  Pain scale: See nurse's notes for pain score.  Pain management: adequate    Airway patency: patent  Anesthetic complications: No anesthetic complications  PONV Status: none  Cardiovascular status: acceptable  Respiratory status: acceptable  Hydration status: acceptable    Comments: Patient seen and examined postoperatively; vital signs stable; SpO2 greater than or equal to 90%; cardiopulmonary status stable; nausea/vomiting adequately controlled; pain adequately controlled; no apparent anesthesia complications; patient discharged from anesthesia care when discharge criteria were met

## 2022-09-07 NOTE — ANESTHESIA PROCEDURE NOTES
Peripheral Block      Patient location during procedure: pre-op  Reason for block: procedure for pain, at surgeon's request and post-op pain management  Performed by  Anesthesiologist: Joss Jimenez MD  Preanesthetic Checklist  Completed: patient identified, IV checked, site marked, risks and benefits discussed, surgical consent, monitors and equipment checked, pre-op evaluation and timeout performed  Prep:  Pt Position: supine  Sterile barriers:washed/disinfected hands, gloves and mask  Prep: ChloraPrep  Patient monitoring: continuous pulse oximetry, EKG and blood pressure monitoring  Procedure    Sedation: yes  Performed under: local infiltration  Guidance:ultrasound guided    ULTRASOUND INTERPRETATION. Using ultrasound guidance a 20 G gauge needle was placed in close proximity to the nerve, at which point, under ultrasound guidance anesthetic was injected in the area of the nerve and spread of the anesthesia was seen on ultrasound in close proximity thereto.  There were no abnormalities seen on ultrasound; a digital image was taken; and the patient tolerated the procedure with no complications. Images:still images not obtained    Laterality:left  Block Type:adductor canal block  Injection Technique:single-shot  Needle Type:echogenic  Resistance on Injection: none    Medications Used: dexamethasone (DECADRON) injection, 4 mg  ropivacaine (NAROPIN) 0.5 % injection, 20 mL  Med administered at 9/7/2022 8:00 AM      Post Assessment  Injection Assessment: negative aspiration for heme, no paresthesia on injection and incremental injection  Patient Tolerance:comfortable throughout block  Complications:no

## 2022-09-07 NOTE — PLAN OF CARE
Goal Outcome Evaluation:              Outcome Evaluation: Pt A&Ox4, VSS, pain controlled. LTKA.  Educated on IS and orientation to room/call light. Plans to go home with HH after d/c.  Consult added for Advanced Directive info.

## 2022-09-07 NOTE — DISCHARGE PLACEMENT REQUEST
"Donovan Murguia (72 y.o. Male)             Date of Birth   1950    Social Security Number       Address   [address] [place] [address]    Home Phone   [phone]    MRN   [id number]       Gnosticism   Episcopal    Marital Status                               Admission Date   9/7/22    Admission Type   Elective    Admitting Provider   Heriberto Rodríguez MD    Attending Provider   Heriberto Rodríguez MD    Department, Room/Bed   [address], P893/1       Discharge Date       Discharge Disposition   Home-Health Care [facility]    Discharge Destination                               Attending Provider: Heriberto Rodríguez MD    Allergies: No Known Allergies    Isolation: None   Infection: COVID Screen (preop/placement) (05/12/22)   Code Status: Not on file   Advance Care Planning Activity    Ht: 185.4 cm (72.99\")   Wt: 93.4 kg (206 lb)    Admission Cmt: None   Principal Problem: Primary osteoarthritis of left knee [M17.12] More...                 Active Insurance as of 9/7/2022     Primary Coverage     Payor Plan Insurance Group Employer/Plan Group    HUMANA MEDICARE REPLACEMENT HUMANA MEDICARE REPLACEMENT [id number]     Payor Plan Address Payor Plan Phone Number Payor Plan Fax Number Effective Dates    [address] [address] [phone]  1/1/2019 - None Entered    [facility] [id number]       Subscriber Name Subscriber Birth Date Member ID       DONOVAN MURGUIA 1950 [id number]                 Emergency Contacts      (Rel.) Home Phone Work Phone Mobile Phone    SUKHJINDER MURGUIA (Spouse) [phone] -- [phone]              "

## 2022-09-08 ENCOUNTER — HOME HEALTH ADMISSION (OUTPATIENT)
Dept: HOME HEALTH SERVICES | Facility: HOME HEALTHCARE | Age: 72
End: 2022-09-08

## 2022-09-08 VITALS
WEIGHT: 212 LBS | BODY MASS INDEX: 28.1 KG/M2 | RESPIRATION RATE: 16 BRPM | SYSTOLIC BLOOD PRESSURE: 125 MMHG | DIASTOLIC BLOOD PRESSURE: 71 MMHG | HEART RATE: 58 BPM | HEIGHT: 73 IN | OXYGEN SATURATION: 91 % | TEMPERATURE: 97.3 F

## 2022-09-08 LAB
GLUCOSE BLDC GLUCOMTR-MCNC: 183 MG/DL (ref 70–130)
HCT VFR BLD AUTO: 36.1 % (ref 37.5–51)
HGB BLD-MCNC: 12.3 G/DL (ref 13–17.7)

## 2022-09-08 PROCEDURE — 85014 HEMATOCRIT: CPT | Performed by: NURSE PRACTITIONER

## 2022-09-08 PROCEDURE — 97530 THERAPEUTIC ACTIVITIES: CPT

## 2022-09-08 PROCEDURE — A9270 NON-COVERED ITEM OR SERVICE: HCPCS | Performed by: NURSE PRACTITIONER

## 2022-09-08 PROCEDURE — 63710000001 ASPIRIN 81 MG TABLET DELAYED-RELEASE: Performed by: NURSE PRACTITIONER

## 2022-09-08 PROCEDURE — 97116 GAIT TRAINING THERAPY: CPT

## 2022-09-08 PROCEDURE — 85018 HEMOGLOBIN: CPT | Performed by: NURSE PRACTITIONER

## 2022-09-08 PROCEDURE — 82962 GLUCOSE BLOOD TEST: CPT

## 2022-09-08 PROCEDURE — 63710000001 AMLODIPINE 5 MG TABLET: Performed by: NURSE PRACTITIONER

## 2022-09-08 PROCEDURE — G0378 HOSPITAL OBSERVATION PER HR: HCPCS

## 2022-09-08 PROCEDURE — 63710000001 METFORMIN 500 MG TABLET: Performed by: NURSE PRACTITIONER

## 2022-09-08 RX ADMIN — AMLODIPINE BESYLATE 5 MG: 5 TABLET ORAL at 08:24

## 2022-09-08 RX ADMIN — METFORMIN HYDROCHLORIDE 500 MG: 500 TABLET ORAL at 08:24

## 2022-09-08 RX ADMIN — ASPIRIN 81 MG: 81 TABLET, COATED ORAL at 08:24

## 2022-09-08 NOTE — PROGRESS NOTES
[facility] to provide HH PT.  Noted order per Dr Rodríguez in epic.  Spoke with patient and wife in room and verified all info.  Prefers home number first, wife's cell second.  States the number [phone] is an old number and can be removed.  D/Cing today is the plan. States has no home health history.

## 2022-09-08 NOTE — PLAN OF CARE
Goal Outcome Evaluation:           Progress: improving   Pt is a post op day 1 of a left total knee. Dressing is clean dry and intact. Pt continues with the ACE, HV and CONCHIS. Green light flashing. Pt has minimal complaints of pain. Pt has ambulated to the bathroom with an assist x1. Pt voiding but having some retention. Pt was complaining of some discomfort and pressure in the bladder area. Pt was straight cath at 2215 with 850ml or urine returned. Pt voiced relief. Pt educated on the importance of monitoring blood pressures related to comorbidity of HTN. Pt voiced understanding. Pt is resting at this time, will continue to monitor.

## 2022-09-08 NOTE — PLAN OF CARE
Goal Outcome Evaluation:              Outcome Evaluation: Pt VSS, pain controlled, d/c home today with HH.  Spouse at bedside.  Educated on continued use of IS at home.

## 2022-09-08 NOTE — DISCHARGE SUMMARY
Patient Name: Everton Murguia  Patient YOB: 1950    Date of Admission:  9/7/2022  Date of Discharge:  9/8/2022  Discharge Diagnosis: HI TOTAL KNEE ARTHROPLASTY [06267] (TOTAL KNEE ARTHROPLASTY)  Presenting Problem/History of Present Illness: Primary osteoarthritis of left knee [M17.12]  Primary osteoarthritis of right knee [M17.11]  Admitting Physician: Dr Heriberto Rodríguez  Consults:   Consults     No orders found for last 30 day(s).          DETAILS OF HOSPITAL STAY:  Patient is a 72 y.o. male was admitted to the floor following the above procedure and underwent an uncomplicated hospital stay.  Patient did well with physical therapy and was ambulating well without problems.  On the day of discharge the wound was clean, dry and intact and calf was soft and nontender and Homans sign was negative.  Patient was tolerating   Diet Instructions     Advance Diet as Tolerated      May advance diet as tolerated while in hospital.    Diet:      Diet Texture / Consistency: Regular       without problems.  Patient will be discharged home.    Condition on Discharge:  Stable    Vital Signs  Temp:  [97 °F (36.1 °C)-97.7 °F (36.5 °C)] 97.3 °F (36.3 °C)  Heart Rate:  [43-73] 58  Resp:  [12-18] 16  BP: (114-148)/(64-87) 125/71    LABS:      Admission on 09/07/2022   Component Date Value Ref Range Status   • Glucose 09/07/2022 131 (A) 70 - 130 mg/dL Final    Meter: [id number] : [id number] Dileep Troncoso RN   • Glucose 09/07/2022 192 (A) 70 - 130 mg/dL Final    Meter: [id number] : [id number] Lawanda Posey RN   • Hemoglobin 09/08/2022 12.3 (A) 13.0 - 17.7 g/dL Final   • Hematocrit 09/08/2022 36.1 (A) 37.5 - 51.0 % Final   • Glucose 09/08/2022 183 (A) 70 - 130 mg/dL Final    Meter: [id number] : [id number] Dakota LINDSAY       No results found.    Discharge Medications     Discharge Medications      New Medications      Instructions Start Date   aspirin 81 MG EC tablet   Take 1 tablet by mouth 2 (Two) Times a Day for  14 days, THEN 1 tablet Daily for 28 days.   Start Date: September 7, 2022     HYDROcodone-acetaminophen 7.5-325 MG per tablet  Commonly known as: NORCO   Take 1-2 tablets by mouth Every 4 (Four) to 6 (Six) Hours As Needed for pain. Take 2 only when in severe pain.      ondansetron 4 MG tablet  Commonly known as: Zofran   4 mg, Oral, Every 8 Hours PRN      pantoprazole 40 MG EC tablet  Commonly known as: PROTONIX   40 mg, Oral, Daily      polyethylene glycol 17 GM/SCOOP powder  Commonly known as: MIRALAX   Dissolve 17 g (1 capful) in liquid and drink by mouth 2 (Two) Times a Day for 7 days.         Changes to Medications      Instructions Start Date   meloxicam 15 MG tablet  Commonly known as: MOBIC  What changed:   · how much to take  · how to take this  · when to take this  · additional instructions   Take 1 tablet by mouth Daily for 14 days         Continue These Medications      Instructions Start Date   amLODIPine 5 MG tablet  Commonly known as: NORVASC   5 mg, Oral, Daily      metFORMIN 500 MG tablet  Commonly known as: GLUCOPHAGE   500 mg, Oral, Daily With Breakfast             Discharge Instructions: Patient is to continue with physical therapy exercises daily and continue working with the physical therapist as ordered. Patient may weight bear as tolerated. Apply ice regularly. Patient may ice for long periods of time as long as ice is not directly on the skin. Patient instructed on frequent calf pumping exercises.  Patient also instructed on incentive spirometer during hospitalization and encouraged to continue to use at home regularly.    Dressing: The dressing is designed to be left in place until you return to the office in 2 weeks.  The suction unit should stop functioning at 7 days and the green light will switch to yellow.  At that point the suction unit and tubing can be disconnected at the port closest to the dressing.  The suction unit and tubing may be discarded.  You may shower immediately upon  return home, you will need to turn the pump off by depressing the orange button once and then you may disconnect the pump and tubing at the connection port.  After showering, shake off the excess water and reattach the tubing.  Restart the pump by depressing the orange button one time and you will notice the green light flashing again.  If the dressing becomes dislodged or saturated it should be changed. Please refer to the [facility] information sheet if you have any questions about the dressing.  If you have a home health nurse or therapist they can be contacted to assist with dressing change or repair. You may also call the [facility] dressing hotline for questions related to the dressing ([phone]). If there are still other problems or questions related to the dressing despite these measures then you can contact Alana at our office [phone].  If for some reason the [facility] dressing is removed, after 7 days the wound can be gently cleaned with antibacterial soap then allowed to dry and covered with a dry sterile dressing. The wound should be covered at all times except while showering.  Patient may change dressings daily and prn using sterile 4x4 and paper tape, and should call if any unusual drainage, redness or swelling.*  Follow up appointment in 2 weeks - patient to call the office at [phone] to schedule.  Patient will be discharged on aspirin 81mg BID x weeks, then daily x 4weeks    Discharge Diagnosis:    Patient Active Problem List   Diagnosis   • Primary osteoarthritis of right knee   • Primary osteoarthritis of left knee       Follow-up Appointments  Future Appointments   Date Time Provider Department Center   9/22/2022  1:00 PM Heriberto Rodríguez MD [facility] [address] [facility]  09/08/22  08:20 EDT

## 2022-09-08 NOTE — THERAPY DISCHARGE NOTE
Patient Name: Everton Murguia  : 1950    MRN: [id number]                              Today's Date: 2022       Admit Date: 2022    Visit Dx:     ICD-10-CM ICD-9-CM   1. S/P TKR (total knee replacement), left  Z96.652 V43.65   2. Primary osteoarthritis of left knee  M17.12 715.16     Patient Active Problem List   Diagnosis   • Primary osteoarthritis of right knee   • Primary osteoarthritis of left knee     Past Medical History:   Diagnosis Date   • Arthritis    • Hypertension    • Knee pain, bilateral    • Polio    • Type 2 diabetes mellitus, with long-term current use of insulin (HCC)      Past Surgical History:   Procedure Laterality Date   • TOTAL KNEE ARTHROPLASTY Left 2022    Procedure: TOTAL KNEE ARTHROPLASTY WITH CORI ROBOT;  Surgeon: Heriberto Rodríguez MD;  Location: [facility] OR [facility];  Service: Orthopedics;  Laterality: Left;      General Information     Row Name 22          Physical Therapy Time and Intention    Document Type discharge treatment  -     Mode of Treatment individual therapy;physical therapy  -     Row Name 22          General Information    Patient Profile Reviewed yes  -     Existing Precautions/Restrictions no known precautions/restrictions  -     Row Name 22          Cognition    Orientation Status (Cognition) oriented x 4  -     Row Name 22          Safety Issues, Functional Mobility    Impairments Affecting Function (Mobility) endurance/activity tolerance;strength;pain;range of motion (ROM)  -           User Key  (r) = Recorded By, (t) = Taken By, (c) = Cosigned By    Initials Name Provider Type     Ada Galicia PT Physical Therapist               Mobility     Row Name 22          Bed Mobility    Bed Mobility supine-sit  -     Supine-Sit [place] (Bed Mobility) independent  -     Row Name 22          Sit-Stand Transfer    Sit-Stand [place] (Transfers) independent  -     Assistive  Device (Sit-Stand Transfers) walker, front-wheeled  -[facility] Name 09/08/22 0939          Gait/Stairs (Locomotion)    [place] Level (Gait) independent  -     Assistive Device (Gait) walker, front-wheeled  -     Distance in Feet (Gait) 250ft  -     Deviations/Abnormal Patterns (Gait) antalgic;dunia decreased;gait speed decreased;weight shifting decreased;stride length decreased  -     Bilateral Gait Deviations forward flexed posture;heel strike decreased  -     [place] Level (Stairs) modified independence;verbal cues  -     Handrail Location (Stairs) both sides  -     Number of Steps (Stairs) 4  -     Ascending Technique (Stairs) step-to-step  -     Descending Technique (Stairs) step-to-step  -     Comment, (Gait/Stairs) Cues for increased L knee flexion and heel strike, Cues for sequencing on steps  -[facility] Name 09/08/22 0939          Mobility    Extremity Weight-bearing Status left lower extremity  -     Left Lower Extremity (Weight-bearing Status) weight-bearing as tolerated (WBAT)  -           User Key  (r) = Recorded By, (t) = Taken By, (c) = Cosigned By    Initials Name Provider Type     Ada Galicia PT Physical Therapist               Obj/Interventions     [facility] Name 09/08/22 0940          Motor Skills    Therapeutic Exercise --  10 reps TKA protocol  -           User Key  (r) = Recorded By, (t) = Taken By, (c) = Cosigned By    Initials Name Provider Type     Ada Galicia, PT Physical Therapist               Goals/Plan     [facility] Name 09/08/22 0943          Bed Mobility Goal 1 (PT)    Activity/Assistive Device (Bed Mobility Goal 1, PT) bed mobility activities, all  -     [place] Level/Cues Needed (Bed Mobility Goal 1, PT) independent  -     Time Frame (Bed Mobility Goal 1, PT) 3 days  -     Progress/Outcomes (Bed Mobility Goal 1, PT) goal met  -[facility] Name 09/08/22 0943          Transfer Goal 1 (PT)    Activity/Assistive Device (Transfer Goal  1, PT) transfers, all  -     [place] Level/Cues Needed (Transfer Goal 1, PT) independent  -BH     Time Frame (Transfer Goal 1, PT) 3 days  -     Progress/Outcome (Transfer Goal 1, PT) goal met  -     Row Name 09/08/22 0943          Gait Training Goal 1 (PT)    Activity/Assistive Device (Gait Training Goal 1, PT) gait (walking locomotion)  -     [place] Level (Gait Training Goal 1, PT) independent  -     Distance (Gait Training Goal 1, PT) 200ft  -     Time Frame (Gait Training Goal 1, PT) 3 days  -     Progress/Outcome (Gait Training Goal 1, PT) goal met  -     Row Name 09/08/22 0943          Stairs Goal 1 (PT)    Activity/Assistive Device (Stairs Goal 1, PT) stairs, all skills  -     [place] Level/Cues Needed (Stairs Goal 1, PT) standby assist  -     Number of Stairs (Stairs Goal 1, PT) 2  -     Time Frame (Stairs Goal 1, PT) 3 days  -     Progress/Outcome (Stairs Goal 1, PT) goal met  -           User Key  (r) = Recorded By, (t) = Taken By, (c) = Cosigned By    Initials Name Provider Type     Ada Galicia, PT Physical Therapist               Clinical Impression     Row Name 09/08/22 0940          Pain    Pretreatment Pain Rating 0/10 - no pain  -     Posttreatment Pain Rating 0/10 - no pain  -     Row Name 09/08/22 0940          Plan of Care Review    Plan of Care Reviewed With patient  -     Progress improving  -     Outcome Evaluation Pt agreeable to PT this AM and tolerated progressed gait distance well. Pt transferred to [facility] and stood independently. Pt ambulated a total of 250ft with rwx, demo'd improved steadiness and independence. Cues for increased L knee flexion for heel strike, but otherwise good gait mechanics. Pt returned to room and agreeable to [facility]. Tolerated TKA exercises and educated about frequent, short bouts of walking at home as well as icing, elevation, and answering questions about positioning in bed. Pt met all therapy goals and  is mobilizing safely and independence. Anticipate D/C home with HHPT today. PT will sign-off.  -     Row Name 09/08/22 0940          Vital Signs    O2 Delivery Pre Treatment room air  -     O2 Delivery Intra Treatment room air  -     O2 Delivery Post Treatment room air  -     Row Name 09/08/22 0940          Positioning and Restraints    Pre-Treatment Position in bed  -     Post Treatment Position chair  -     In Chair reclined;call light within reach;encouraged to call for assist  No bed alarm on arrival  -           User Key  (r) = Recorded By, (t) = Taken By, (c) = Cosigned By    Initials Name Provider Type     Ada Galicia PT Physical Therapist               Outcome Measures     Row Name 09/08/22 0943 09/08/22 0827       How much help from another person do you currently need...    Turning from your back to your side while in flat bed without using bedrails? 4  - 3  -SL    Moving from lying on back to sitting on the side of a flat bed without bedrails? 4  - 3  -SL    Moving to and from a bed to a chair (including a wheelchair)? 4  - 4  -SL    Standing up from a chair using your arms (e.g., wheelchair, bedside chair)? 4  - 4  -SL    Climbing 3-5 steps with a railing? 4  - 3  -SL    To walk in hospital room? 4  - 3  -SL    AM-PAC 6 Clicks Score (PT) 24  - 20  -SL    Highest level of mobility 8 --> Walked 250 feet or more  - 6 --> Walked 10 steps or more  -    Row Name 09/08/22 0943          Functional Assessment    Outcome Measure Options AM-PAC 6 Clicks Basic Mobility (PT)  -           User Key  (r) = Recorded By, (t) = Taken By, (c) = Cosigned By    Initials Name Provider Type     Ada Galicia PT Physical Therapist    Elvira Mensah RN Registered Nurse              Physical Therapy Education                 Title: PT OT SLP Therapies (Done)     Topic: Physical Therapy (Done)     Point: Mobility training (Done)     Learning Progress Summary            Patient Acceptance, E,TB,D, VU by  at 9/8/2022 0943    Acceptance, E,TB,D, VU,NR by  at 9/7/2022 1646                   Point: Home exercise program (Done)     Learning Progress Summary           Patient Acceptance, E,TB,D, VU by  at 9/8/2022 0943    Acceptance, E,TB,D, VU,NR by  at 9/7/2022 1646                   Point: Body mechanics (Done)     Learning Progress Summary           Patient Acceptance, E,TB,D, VU by  at 9/8/2022 0943    Acceptance, E,TB,D, VU,NR by  at 9/7/2022 1646                   Point: Precautions (Done)     Learning Progress Summary           Patient Acceptance, E,TB,D, VU by  at 9/8/2022 0943    Acceptance, E,TB,D, VU,NR by  at 9/7/2022 1646                               User Key     Initials Effective Dates Name Provider Type Discipline     04/08/22 -  Ada Galicia, PT Physical Therapist PT              PT Recommendation and Plan  Planned Therapy Interventions (PT): balance training, bed mobility training, gait training, home exercise program, patient/family education, ROM (range of motion), stair training, strengthening, transfer training  Plan of Care Reviewed With: patient  Progress: improving  Outcome Evaluation: Pt agreeable to PT this AM and tolerated progressed gait distance well. Pt transferred to EOB and stood independently. Pt ambulated a total of 250ft with rwx, demo'd improved steadiness and independence. Cues for increased L knee flexion for heel strike, but otherwise good gait mechanics. Pt returned to room and agreeable to sitting [facility]. Tolerated TKA exercises and educated about frequent, short bouts of walking at home as well as icing, elevation, and answering questions about positioning in bed. Pt met all therapy goals and is mobilizing safely and independence. Anticipate D/C home with HHPT today. PT will sign-off.     Time Calculation:    PT Charges     Row Name 09/08/22 0944             Time Calculation    Start Time 0915  -      Stop Time 0938  -       Time Calculation (min) 23 min  -      PT Received On 09/08/22  -              Time Calculation- PT    Total Timed Code Minutes- PT 23 minute(s)  -              Timed Charges    65388 - Gait Training Minutes  13  -      35965 - PT Therapeutic Activity Minutes 10  -              Total Minutes    Timed Charges Total Minutes 23  -       Total Minutes 23  -            User Key  (r) = Recorded By, (t) = Taken By, (c) = Cosigned By    Initials Name Provider Type     Ada Galicia, PT Physical Therapist              Therapy Charges for Today     Code Description Service Date Service Provider Modifiers Qty    17336127562 HC GAIT TRAINING EA 15 MIN 9/7/2022 Ada Galicia, PT GP 1    56515686743 HC PT EVAL LOW COMPLEXITY 2 9/7/2022 Ada Galicia, PT GP 1    32334767674 HC GAIT TRAINING EA 15 MIN 9/8/2022 Ada Galicia, PT GP 1    70791182120 HC PT THERAPEUTIC ACT EA 15 MIN 9/8/2022 Ada Galicia, PT GP 1          PT G-Codes  Outcome Measure Options: AM-PAC 6 Clicks Basic Mobility (PT)  AM-PAC 6 Clicks Score (PT): 24    PT Discharge Summary  Anticipated Discharge Disposition (PT): home with assist, home with home health    Ada Galicia, PT  9/8/2022

## 2022-09-08 NOTE — OP NOTE
Name: Everton Murguia  YOB: 1950    DATE OF SURGERY: 9/7/2022    PREOPERATIVE DIAGNOSIS: Left knee end-stage osteoarthritis    POSTOPERATIVE DIAGNOSIS: Left knee end-stage osteoarthritis    PROCEDURE PERFORMED: Left total knee replacement with [employer] robotic assistance    SURGEON: Heriberto Rodríguez M.D.    ASSISTANT: GIRISH GERONIMO    A surgical assistant was integral in ensuring a successful outcome with this procedure.  The assistant was utilized to assist in positioning the patient, draping the patient, was used throughout the case to provide with retraction of tissues, suctioning of blood and body fluids for visualization, positioning of the extremity to allow for proper exposure so that I could perform the procedure.  Without the use of a surgical assistant during this procedure I feel that the outcome may have been compromised or would have been suboptimal or at risk for complications.    IMPLANTS: [employer] Legion:     Implant Name Type Inv. Item Serial No.  Lot No. LRB No. Used Action   CMT BONE PALACOS R HI/VISC 1X40 - [id number] Implant CMT BONE PALACOS R HI/VISC 1X40  [facility] [id number] Left 1 Implanted   DEV CONTRL TISS STRATAFIX SYMM PDS PLUS MINO CT-1 60CM - [id number] Implant DEV CONTRL TISS STRATAFIX SYMM PDS PLUS MINO CT-1 60CM  [employer]  DIV [employer] J [employer] J [id number] Left 1 Implanted   DEV CONTRL TISS STRATAFIXSPIRALMNCRYL PLSPS2 REV3/0 45CM - [id number] Implant DEV CONTRL TISS STRATAFIXSPIRALMNCRYL PLSPS2 REV3/0 45CM  [employer]  DIV OF J AND J [id number] Left 1 Implanted   CMT BONE PALACOS R HI/VISC 1X40 - [id number] Implant CMT BONE PALACOS R HI/VISC 1X40  [facility] [id number] Left 1 Implanted   COMP FEM LEGION OXINIUM CR SZ6 LT - [id number] Implant COMP FEM LEGION OXINIUM CR SZ6 LT  [employer] [id number] Left 1 Implanted   BASE TIB/KN GEN2 NONPOR TI SZ6 LT - [id number] Implant BASE TIB/KN GEN2 NONPOR TI SZ6 LT  [employer] [id number] Left 1 Implanted    PAT GEN2 BICONVEX [id number] - [id number] Implant PAT GEN2 BICONVEX [id number]  [employer] [id number] Left 1 Implanted   INSRT ART/KN LEGION CR HF XLPE SZ5TO6 11MM - [id number] Implant INSRT ART/KN LEGION CR HF XLPE SZ5TO6 11MM  [employer] [id number] Left 1 Implanted       Estimated Blood Loss: 200cc  Specimens : none  Complications: none    DESCRIPTION OF PROCEDURE: The patient was taken to the operating room and placed in the supine position. A sequential compression device was carefully placed on the non-operative leg. Preoperative antibiotics were administered. Surgical time out was performed. After adequate induction of anesthesia, the leg was prepped and draped in the usual sterile fashion, exsanguinated with an Esmarch bandage and the tourniquet inflated to 250 mmHg. A midline incision was performed followed by a medial parapatellar arthrotomy. The patella was subluxed laterally.  A portion of the fat pad, ACL, and anterior horns of the meniscus were excised.  At this point we placed the 2 pins in the femur and 2 pins in the tibia in standard position.  The pins were placed 1 hand breath outside the apex of the surgical incision.  The pins were placed through the near cortex and into the far cortex but not through.   We then attached the femoral and tibial arrays.  I placed a checkpoint marker within the incision on the medial aspect of the femur and medial tibial plateau.  At this point we performed the registration and mapping portion of the procedure.  We tensioned the knee in flexion and extension and then performed the planning proportion of the procedure such that the knee was nicely balanced medial lateral and in flexion and extension.  All components were sized appropriately.  I then used the robotic bur to remove the distal femoral cut according to the plan.  The 4-in-1 cutting block was then robotically aligned and all 4 cuts were performed.  We then exposed the proximal tibia and used the  extramedullary cutting guide and used the robotic assisted check guide to properly position the tibial cut according to plan.  The tibial guide was then pinned in place and the tibial cut performed.  The posterior horns of the menisci were excised. The posterior osteophytes were removed. Flexion extension blocks were then used to check the balance of the knee. The tibial cut surface was then sized with the sizing templates and the tibial and femoral trial were then placed. The knee was placed in full extension and then the tibial tray rotation was then matched to the femoral rotation and marked.    Attention was then placed to the patella. The patella was noted to track centrally through range of motion. The patella was then sized with the trials. The thickness of the patella was then measured. The patella was resurfaced and the surrounding osteophytes were removed. The preoperative thickness was reproduced. The patella tracked centrally through range of motion.  We then checked the balance with the trial implants in place and there was excellent medial lateral and flexion-extension balance.  The tibial and femoral arrays were then removed.  The checkpoint markers were then removed.   At this point all trial components were removed, the knee was copiously irrigated with pulsed lavage, and the knee was injected with anesthetic cocktail solution. The cut surfaces were then dried with clean lap sponges, and the components were cemented tibia, followed by femur, then patella. The knee was held in full extension and all excess cement was removed. The knee was held still until the cement had completely hardened. We then placed the trial polyethylene spacer which resulted in full extension and excellent flexion-extension balance. We placed the final polyethylene spacer.   The knee was then copiously irrigated. The tourniquet was then released. There was excellent hemostasis. We placed a one-eighth inch Hemovac drain. We  closed the knee in multiple layers in standard fashion. Sterile dressing were applied. At the end of the case, the sponge and needle counts were reported as being correct. There were no known complications. The patient was then transported to the recovery room.      Heriberto Rodríguez M.D.

## 2022-09-09 ENCOUNTER — HOME CARE VISIT (OUTPATIENT)
Dept: HOME HEALTH SERVICES | Facility: HOME HEALTHCARE | Age: 72
End: 2022-09-09

## 2022-09-09 VITALS
HEART RATE: 56 BPM | RESPIRATION RATE: 18 BRPM | SYSTOLIC BLOOD PRESSURE: 110 MMHG | DIASTOLIC BLOOD PRESSURE: 60 MMHG | OXYGEN SATURATION: 94 %

## 2022-09-09 PROCEDURE — G0151 HHCP-SERV OF PT,EA 15 MIN: HCPCS

## 2022-09-09 NOTE — CASE COMMUNICATION
"REASON FOR REFERRAL : 73 y/o male with hospital stay from 9/7/22 to 9/8/22 due to TKR left. SURGICAL PROCEDURE: TKR left by Dr Rodríguez on 9/7/22 PRIOR LEVEL OF FUNCTION: Ambulated with , independent with ADL's PAST MEDICAL HISTORY: • Arthritis • Hypertension • Knee pain, bilateral • Polio • Type 2 diabetes mellitus, with long-term current use of insulin Social status / home environment: Patient lives with wife and adult son in a one floor  home with 4 steps for exit and entry with one rail. PLAN FOR NEXT VISIT: Review HEP, Gait , transfers, Pain /edema management. SUBJECTIVE: “ I an having a hard time gettnig up from the recliner and i used to walk with my ledgt foot turned to the side.\" MEDICAL NECESSITY FOR ONGOING SKILLED THERAPY: to increase ROM/strength with progression of HEP as tolerated, transfer training on/off all household surfaces. Gait training for deviation s with progression from walker to cane when tolerated, patient and family education for pain/edema control and home safety, in order to prepare for outpatient physical therapy requiring functional strength and return to independent gait on all surfaces and stairs with devices as needed for community mobility as well as for medical and personal appointments. Patient will benefit from PT to monitor vitals, instruct and progress in HEP for  strengthening, balance training, energy conservation education, transfer, gait training, and home safety education. Recommend frequency 2 week 1, 3 week 2.  "

## 2022-09-09 NOTE — HOME HEALTH
"REASON FOR REFERRAL :  71 y/o male with hospital stay from 9/7/22 to 9/8/22 due to TKR left.    SURGICAL PROCEDURE:  TKR left by Dr Rodríguez on 9/7/22    PRIOR LEVEL OF FUNCTION: Ambulated with , independent with ADL's  PAST MEDICAL HISTORY:  • Arthritis    • Hypertension    • Knee pain, bilateral    • Polio    • Type 2 diabetes mellitus, with long-term current use of insulin         Social status / home environment: Patient lives with wife and adult son in a one floor home with 4 steps for exit and entry with one rail.    PLAN FOR NEXT VISIT: Review HEP, Gait , transfers, Pain /edema management.    SUBJECTIVE: “ I an having a hard time gettnig up from the recliner and i used to walk with my ledgt foot turned to the side.\"    MEDICAL NECESSITY FOR ONGOING SKILLED THERAPY: to increase ROM/strength with progression of HEP as tolerated, transfer training on/off all household surfaces. Gait training for deviations with progression from walker to cane when tolerated, patient and family education for pain/edema control and home safety, in order to prepare for outpatient physical therapy requiring functional strength and return to independent gait on all surfaces and stairs with devices as needed for community mobility as well as for medical and personal appointments.  Patient will benefit from PT to monitor vitals, instruct and progress in HEP for strengthening, balance training, energy conservation education, transfer, gait training, and home safety education. Recommend frequency 1 week 1, 3 week 2."

## 2022-09-10 ENCOUNTER — HOME CARE VISIT (OUTPATIENT)
Dept: HOME HEALTH SERVICES | Facility: HOME HEALTHCARE | Age: 72
End: 2022-09-10

## 2022-09-10 ENCOUNTER — HOSPITAL ENCOUNTER (EMERGENCY)
Facility: HOSPITAL | Age: 72
Discharge: HOME | End: 2022-09-10
Attending: EMERGENCY MEDICINE | Admitting: EMERGENCY MEDICINE

## 2022-09-10 VITALS
HEIGHT: 73 IN | WEIGHT: 217 LBS | RESPIRATION RATE: 17 BRPM | OXYGEN SATURATION: 95 % | SYSTOLIC BLOOD PRESSURE: 152 MMHG | BODY MASS INDEX: 28.76 KG/M2 | DIASTOLIC BLOOD PRESSURE: 89 MMHG | HEART RATE: 64 BPM | TEMPERATURE: 97.7 F

## 2022-09-10 VITALS
OXYGEN SATURATION: 95 % | DIASTOLIC BLOOD PRESSURE: 86 MMHG | TEMPERATURE: 97.7 F | HEART RATE: 64 BPM | SYSTOLIC BLOOD PRESSURE: 158 MMHG

## 2022-09-10 DIAGNOSIS — R33.8 ACUTE URINARY RETENTION: Primary | ICD-10-CM

## 2022-09-10 LAB
BACTERIA UR QL AUTO: ABNORMAL /HPF
BILIRUB UR QL STRIP: NEGATIVE
CLARITY UR: CLEAR
COLOR UR: YELLOW
GLUCOSE UR STRIP-MCNC: ABNORMAL MG/DL
HGB UR QL STRIP.AUTO: ABNORMAL
HYALINE CASTS UR QL AUTO: ABNORMAL /LPF
KETONES UR QL STRIP: NEGATIVE
LEUKOCYTE ESTERASE UR QL STRIP.AUTO: NEGATIVE
NITRITE UR QL STRIP: NEGATIVE
PH UR STRIP.AUTO: <=5 [PH] (ref 5–8)
PROT UR QL STRIP: NEGATIVE
RBC # UR STRIP: ABNORMAL /HPF
REF LAB TEST METHOD: ABNORMAL
SP GR UR STRIP: 1.02 (ref 1–1.03)
SQUAMOUS #/AREA URNS HPF: ABNORMAL /HPF
UROBILINOGEN UR QL STRIP: ABNORMAL
WBC # UR STRIP: ABNORMAL /HPF

## 2022-09-10 PROCEDURE — 51702 INSERT TEMP BLADDER CATH: CPT

## 2022-09-10 PROCEDURE — 81001 URINALYSIS AUTO W/SCOPE: CPT | Performed by: EMERGENCY MEDICINE

## 2022-09-10 PROCEDURE — G0151 HHCP-SERV OF PT,EA 15 MIN: HCPCS

## 2022-09-10 PROCEDURE — 99283 EMERGENCY DEPT VISIT LOW MDM: CPT

## 2022-09-10 NOTE — ED NOTES
Pt arrived PV. Pt c/o not being able to to urinate x 1 day. Pt in W/C.      Patient was placed in face mask during first look triage.  Patient was wearing a face mask throughout encounter.  I wore personal protective equipment throughout the encounter.  Hand hygiene was performed before and after patient encounter.

## 2022-09-10 NOTE — ED PROVIDER NOTES
EMERGENCY DEPARTMENT ENCOUNTER    Room Number:  38/38  PCP: Justin Diaz MD  Historian: Patient  History Limited By: Nothing      HPI  Chief Complaint: Urinary retention  Context: Everton Murguia is a 72 y.o. male who presents to the ED c/o urinary retention.  Patient states he had knee replacement September 7.  States he did require in and out catheterization before he left.  States he was able to urinate at home up until about a day ago.  Has been unable to urinate today.  Has had prostate enlargement in the past.  Has had no vomiting or diarrhea.  Has had no chest pain.  Has lower abdominal pain.      Location: Suprapubic abdominal pain  Radiation: None  Character: Aching  Duration: 1 day  Severity: Moderate  Progression: Worsening   Aggravating Factors: Nothing  Alleviating Factors: Nothing        MEDICAL RECORD REVIEW    Patient had knee replacement September 7          PAST MEDICAL HISTORY  Active Ambulatory Problems     Diagnosis Date Noted   • Primary osteoarthritis of right knee 05/12/2022   • Primary osteoarthritis of left knee 05/16/2022     Resolved Ambulatory Problems     Diagnosis Date Noted   • No Resolved Ambulatory Problems     Past Medical History:   Diagnosis Date   • Arthritis    • Hypertension    • Knee pain, bilateral    • Polio    • Type 2 diabetes mellitus, with long-term current use of insulin (HCC)          PAST SURGICAL HISTORY  Past Surgical History:   Procedure Laterality Date   • TOTAL KNEE ARTHROPLASTY Left 9/7/2022    Procedure: TOTAL KNEE ARTHROPLASTY WITH CORI ROBOT;  Surgeon: Heriberto Rodríguez MD;  Location: [facility];  Service: Orthopedics;  Laterality: Left;         FAMILY HISTORY  Family History   Problem Relation Age of Onset   • Malig Hyperthermia Neg Hx          SOCIAL HISTORY  Social History     Socioeconomic History   • Marital status:    Tobacco Use   • Smoking status: Never Smoker   • Smokeless tobacco: Never Used   Vaping Use   • Vaping Use: Never used    Substance and Sexual Activity   • Alcohol use: Never   • Drug use: Never         ALLERGIES  Patient has no known allergies.        REVIEW OF SYSTEMS  Review of Systems   Constitutional: Negative for activity change, appetite change and fever.   HENT: Negative for congestion and sore throat.    Eyes: Negative.    Respiratory: Negative for cough and shortness of breath.    Cardiovascular: Negative for chest pain and leg swelling.   Gastrointestinal: Positive for abdominal pain. Negative for diarrhea and vomiting.   Endocrine: Negative.    Genitourinary: Positive for difficulty urinating. Negative for decreased urine volume and dysuria.   Musculoskeletal: Negative for neck pain.   Skin: Negative for rash and wound.   Allergic/Immunologic: Negative.    Neurological: Negative for weakness, numbness and headaches.   Hematological: Negative.    Psychiatric/Behavioral: Negative.    All other systems reviewed and are negative.           PHYSICAL EXAM  ED Triage Vitals [09/10/22 1406]   Temp Heart Rate Resp BP SpO2   97.7 °F (36.5 °C) 74 16 -- 94 %      Temp src Heart Rate Source Patient Position BP Location FiO2 (%)   Tympanic -- -- -- --       Physical Exam  Vitals and nursing note reviewed.   Constitutional:       General: He is not in acute distress.  HENT:      Head: Normocephalic and atraumatic.   Eyes:      Pupils: Pupils are equal, round, and reactive to light.   Cardiovascular:      Rate and Rhythm: Normal rate and regular rhythm.      Heart sounds: Normal heart sounds.   Pulmonary:      Effort: Pulmonary effort is normal. No respiratory distress.      Breath sounds: Normal breath sounds.   Abdominal:      Palpations: Abdomen is soft.      Tenderness: There is abdominal tenderness. There is no guarding or rebound.   Musculoskeletal:         General: Normal range of motion.      Cervical back: Normal range of motion and neck supple.   Skin:     General: Skin is warm and dry.   Neurological:      Mental Status: He is  alert and oriented to person, place, and time.      Sensory: Sensation is intact. No sensory deficit.      Motor: No weakness.   Psychiatric:         Mood and Affect: Mood and affect normal.       Patient was wearing a face mask when I entered the room and they continued to wear a mask throughout their stay in the ED.  I wore PPE, including  gloves, face mask with shield or face mask with goggles whenever I was in the room with patient.     LAB RESULTS  Recent Results (from the past 24 hour(s))   Urinalysis With Microscopic If Indicated (No Culture) - Urine, Catheter    Collection Time: 09/10/22  2:42 PM    Specimen: Urine, Catheter   Result Value Ref Range    Color, UA Yellow Yellow, Straw    Appearance, UA Clear Clear    pH, UA <=5.0 5.0 - 8.0    Specific Gravity, UA 1.022 1.005 - 1.030    Glucose,  mg/dL (2+) (A) Negative    Ketones, UA Negative Negative    Bilirubin, UA Negative Negative    Blood, UA Moderate (2+) (A) Negative    Protein, UA Negative Negative    Leuk Esterase, UA Negative Negative    Nitrite, UA Negative Negative    Urobilinogen, UA 0.2 E.U./dL 0.2 - 1.0 E.U./dL   Urinalysis, Microscopic Only - Urine, Catheter    Collection Time: 09/10/22  2:42 PM    Specimen: Urine, Catheter   Result Value Ref Range    RBC, UA Too Numerous to Count (A) None Seen, 0-2 /HPF    WBC, UA 0-2 None Seen, 0-2 /HPF    Bacteria, UA None Seen None Seen /HPF    Squamous Epithelial Cells, UA 0-2 None Seen, 0-2 /HPF    Hyaline Casts, UA None Seen None Seen /LPF    Methodology Automated Microscopy        Ordered the above labs and reviewed the results.        RADIOLOGY  No orders to display                PROCEDURES  Procedures            MEDICATIONS GIVEN IN ER  Medications - No data to display          PROGRESS AND CONSULTS  ED Course as of 09/10/22 1528   Sat Sep 10, 2022   1520 15:20 EDT  Patient with urinary retention.  Has had more difficulty urinating since surgery.  Was unable to urinate today.  Patient had over  1000 cc of urine.  Patient will be discharged with catheter in place.  Patient had recent knee surgery.  We will hold off on Flomax as that can make people lightheaded.  Will [facility] with [facility]. [SL]      ED Course User Index  [SL] Steve Quiroz MD           MEDICAL DECISION MAKING      MDM  Number of Diagnoses or Management Options     Amount and/or Complexity of Data Reviewed  Clinical lab tests: reviewed and ordered (Urinalysis positive for blood)               DIAGNOSIS  Final diagnoses:   Acute urinary retention           DISPOSITION  DISCHARGE    Patient discharged in stable condition.    Reviewed implications of results, diagnosis, meds, responsibility to follow up, warning signs and symptoms of possible worsening, potential complications and reasons to return to ER, including worsening symptoms    Patient/Family voiced understanding of above instructions.    Discussed plan for discharge, as there is no emergent indication for admission. Patient referred to primary care provider for BP management due to today's BP. Pt/family is agreeable and understands need for follow up and repeat testing.  Pt is aware that discharge does not mean that nothing is wrong but it indicates no emergency is present that requires admission and they must continue care with follow-up as given below or physician of their choice.     FOLLOW-UP  [facility]  [address] [facility] [address]  [phone]  Schedule an appointment as soon as possible for a visit            Medication List      No changes were made to your prescriptions during this visit.             Latest Documented Vital Signs:  As of 15:28 EDT  BP- 153/84 HR- 65 Temp- 97.7 °F (36.5 °C) (Tympanic) O2 sat- 93%                         Steve Quiroz MD  09/10/22 9091

## 2022-09-11 NOTE — CASE MANAGEMENT/SOCIAL WORK
Continued Stay Note  [facility]     Patient Name: Everton Murguia  MRN: [id number]  Today's Date: 9/11/2022    Admit Date: 9/7/2022     Discharge Plan     Row Name 09/11/22 1442       Plan    Final Discharge Disposition Code 06 - home with home health care    Final Note Pt discharged home to be followed by [facility]               Discharge Codes    No documentation.               Expected Discharge Date and Time     Expected Discharge Date Expected Discharge Time    Sep 8, 2022             Viviana Luna RN

## 2022-09-12 ENCOUNTER — HOME CARE VISIT (OUTPATIENT)
Dept: HOME HEALTH SERVICES | Facility: HOME HEALTHCARE | Age: 72
End: 2022-09-12

## 2022-09-12 VITALS
RESPIRATION RATE: 16 BRPM | SYSTOLIC BLOOD PRESSURE: 140 MMHG | HEART RATE: 61 BPM | OXYGEN SATURATION: 95 % | DIASTOLIC BLOOD PRESSURE: 80 MMHG

## 2022-09-12 PROCEDURE — G0151 HHCP-SERV OF PT,EA 15 MIN: HCPCS

## 2022-09-12 NOTE — HOME HEALTH
Pt presented in his recliner.  He now has a catheter leg bag.  His wife is calling [facility] today to make an appt.  His urine appeared dark in color but he reports he is drinking water.    His L knee dressing is intact and no visible stains.  CONCHIS is working properly.  He has not had a BM yet, but wife plans to give him Ducolax. He has been taking the Miralax.      Plan for next visit: continue to progress HEP as tolerated with TKA protocol, gait with rolling walker, review pain and edema management.

## 2022-09-14 ENCOUNTER — HOME CARE VISIT (OUTPATIENT)
Dept: HOME HEALTH SERVICES | Facility: HOME HEALTHCARE | Age: 72
End: 2022-09-14

## 2022-09-14 VITALS
OXYGEN SATURATION: 95 % | HEART RATE: 69 BPM | SYSTOLIC BLOOD PRESSURE: 142 MMHG | DIASTOLIC BLOOD PRESSURE: 80 MMHG | RESPIRATION RATE: 16 BRPM

## 2022-09-14 PROCEDURE — G0151 HHCP-SERV OF PT,EA 15 MIN: HCPCS

## 2022-09-14 NOTE — CASE COMMUNICATION
Pt has not had a BM yet.  He is taking Miralax BID.  Wife plans to give hime something OTC.  Encouraged fluids and fruits.  Advised to call MD if no BM soon as he is 1 week post-op.  He also now has a catheter (from ED visit over the weekend) and plans to see urology tomorrow.      Thank you  Julia Rivers PT  [phone]

## 2022-09-14 NOTE — HOME HEALTH
Pt presented in his recliner. He reports he is not sleeping well and has been up since 4AM due to discomfort mostly in his back.  He is planning to go to Urology tomorrow to address catheter.  He still has not had a BM.  Note sent to MD office to inform.   He tolerated progression to 20 reps of all supine exercises today and also added 2 standing exercises today.  His L knee ROM is gradually improving.   He continues to rely on the walker and exhibits antalgic gait pattern.      Plan for next visit: continue progressing standing LE TKA protocol exercises, increase L knee ROM as tolerated, progress to cane if he is ready.

## 2022-09-16 ENCOUNTER — HOME CARE VISIT (OUTPATIENT)
Dept: HOME HEALTH SERVICES | Facility: HOME HEALTHCARE | Age: 72
End: 2022-09-16

## 2022-09-16 VITALS
DIASTOLIC BLOOD PRESSURE: 69 MMHG | SYSTOLIC BLOOD PRESSURE: 122 MMHG | OXYGEN SATURATION: 98 % | TEMPERATURE: 97.2 F | HEART RATE: 69 BPM | RESPIRATION RATE: 18 BRPM

## 2022-09-16 PROCEDURE — G0157 HHC PT ASSISTANT EA 15: HCPCS

## 2022-09-16 NOTE — HOME HEALTH
SUBJECTIVE: Patient had a BM this morning. Was unable to urinate at the Urologist so he remains of the catheter. Has F/u next Tues with urologist.     SIGNS/SYMPTOMS OF INFECTION: No    FALLS SINCE LAST VISIT: No    EDEMA: Moderate non-pitting. Calf soft and non-tender    WOUNDS: Dressing intact, no new drainage.

## 2022-09-19 ENCOUNTER — HOME CARE VISIT (OUTPATIENT)
Dept: HOME HEALTH SERVICES | Facility: HOME HEALTHCARE | Age: 72
End: 2022-09-19

## 2022-09-19 PROCEDURE — G0157 HHC PT ASSISTANT EA 15: HCPCS

## 2022-09-20 ENCOUNTER — HOSPITAL ENCOUNTER (OUTPATIENT)
Facility: HOSPITAL | Age: 72
Setting detail: OBSERVATION
Discharge: HOME | End: 2022-09-23
Attending: EMERGENCY MEDICINE | Admitting: HOSPITALIST

## 2022-09-20 ENCOUNTER — APPOINTMENT (OUTPATIENT)
Dept: GENERAL RADIOLOGY | Facility: HOSPITAL | Age: 72
End: 2022-09-20

## 2022-09-20 DIAGNOSIS — R33.9 URINARY RETENTION: ICD-10-CM

## 2022-09-20 DIAGNOSIS — N39.0 ACUTE UTI: Primary | ICD-10-CM

## 2022-09-20 DIAGNOSIS — Z96.652 HX OF TOTAL KNEE ARTHROPLASTY, LEFT: ICD-10-CM

## 2022-09-20 DIAGNOSIS — R78.81 BACTEREMIA: ICD-10-CM

## 2022-09-20 LAB
ALBUMIN SERPL-MCNC: 3.5 G/DL (ref 3.5–5.2)
ALBUMIN/GLOB SERPL: 1.1 G/DL
ALP SERPL-CCNC: 87 U/L (ref 39–117)
ALT SERPL W P-5'-P-CCNC: 23 U/L (ref 1–41)
ANION GAP SERPL CALCULATED.3IONS-SCNC: 10.3 MMOL/L (ref 5–15)
AST SERPL-CCNC: 56 U/L (ref 1–40)
BACTERIA UR QL AUTO: ABNORMAL /HPF
BASOPHILS # BLD AUTO: 0.02 10*3/MM3 (ref 0–0.2)
BASOPHILS NFR BLD AUTO: 0.2 % (ref 0–1.5)
BILIRUB SERPL-MCNC: 0.9 MG/DL (ref 0–1.2)
BILIRUB UR QL STRIP: NEGATIVE
BUN SERPL-MCNC: 17 MG/DL (ref 8–23)
BUN/CREAT SERPL: 22.1 (ref 7–25)
CALCIUM SPEC-SCNC: 8.8 MG/DL (ref 8.6–10.5)
CHLORIDE SERPL-SCNC: 94 MMOL/L (ref 98–107)
CLARITY UR: ABNORMAL
CO2 SERPL-SCNC: 25.7 MMOL/L (ref 22–29)
COLOR UR: ABNORMAL
CREAT SERPL-MCNC: 0.77 MG/DL (ref 0.76–1.27)
CRP SERPL-MCNC: 18.52 MG/DL (ref 0–0.5)
D-LACTATE SERPL-SCNC: 1.1 MMOL/L (ref 0.5–2)
DEPRECATED RDW RBC AUTO: 39.1 FL (ref 37–54)
EGFRCR SERPLBLD CKD-EPI 2021: 94.5 ML/MIN/1.73
EOSINOPHIL # BLD AUTO: 0.13 10*3/MM3 (ref 0–0.4)
EOSINOPHIL NFR BLD AUTO: 1.6 % (ref 0.3–6.2)
ERYTHROCYTE [DISTWIDTH] IN BLOOD BY AUTOMATED COUNT: 12.5 % (ref 12.3–15.4)
ERYTHROCYTE [SEDIMENTATION RATE] IN BLOOD: 47 MM/HR (ref 0–20)
GLOBULIN UR ELPH-MCNC: 3.3 GM/DL
GLUCOSE SERPL-MCNC: 154 MG/DL (ref 65–99)
GLUCOSE UR STRIP-MCNC: NEGATIVE MG/DL
HCT VFR BLD AUTO: 28.9 % (ref 37.5–51)
HGB BLD-MCNC: 9.9 G/DL (ref 13–17.7)
HGB UR QL STRIP.AUTO: ABNORMAL
HYALINE CASTS UR QL AUTO: ABNORMAL /LPF
IMM GRANULOCYTES # BLD AUTO: 0.03 10*3/MM3 (ref 0–0.05)
IMM GRANULOCYTES NFR BLD AUTO: 0.4 % (ref 0–0.5)
KETONES UR QL STRIP: NEGATIVE
LEUKOCYTE ESTERASE UR QL STRIP.AUTO: ABNORMAL
LYMPHOCYTES # BLD AUTO: 0.96 10*3/MM3 (ref 0.7–3.1)
LYMPHOCYTES NFR BLD AUTO: 11.7 % (ref 19.6–45.3)
MCH RBC QN AUTO: 29.5 PG (ref 26.6–33)
MCHC RBC AUTO-ENTMCNC: 34.3 G/DL (ref 31.5–35.7)
MCV RBC AUTO: 86 FL (ref 79–97)
MONOCYTES # BLD AUTO: 0.88 10*3/MM3 (ref 0.1–0.9)
MONOCYTES NFR BLD AUTO: 10.7 % (ref 5–12)
NEUTROPHILS NFR BLD AUTO: 6.2 10*3/MM3 (ref 1.7–7)
NEUTROPHILS NFR BLD AUTO: 75.4 % (ref 42.7–76)
NITRITE UR QL STRIP: POSITIVE
NRBC BLD AUTO-RTO: 0 /100 WBC (ref 0–0.2)
PH UR STRIP.AUTO: 5.5 [PH] (ref 5–8)
PLATELET # BLD AUTO: 285 10*3/MM3 (ref 140–450)
PMV BLD AUTO: 9.5 FL (ref 6–12)
POTASSIUM SERPL-SCNC: 4 MMOL/L (ref 3.5–5.2)
PROCALCITONIN SERPL-MCNC: 0.38 NG/ML (ref 0–0.25)
PROT SERPL-MCNC: 6.8 G/DL (ref 6–8.5)
PROT UR QL STRIP: ABNORMAL
RBC # BLD AUTO: 3.36 10*6/MM3 (ref 4.14–5.8)
RBC # UR STRIP: ABNORMAL /HPF
REF LAB TEST METHOD: ABNORMAL
SODIUM SERPL-SCNC: 130 MMOL/L (ref 136–145)
SP GR UR STRIP: 1.01 (ref 1–1.03)
SQUAMOUS #/AREA URNS HPF: ABNORMAL /HPF
UROBILINOGEN UR QL STRIP: ABNORMAL
WBC # UR STRIP: ABNORMAL /HPF
WBC NRBC COR # BLD: 8.22 10*3/MM3 (ref 3.4–10.8)
YEAST URNS QL MICRO: ABNORMAL /HPF

## 2022-09-20 PROCEDURE — 80053 COMPREHEN METABOLIC PANEL: CPT | Performed by: PHYSICIAN ASSISTANT

## 2022-09-20 PROCEDURE — 84145 PROCALCITONIN (PCT): CPT | Performed by: PHYSICIAN ASSISTANT

## 2022-09-20 PROCEDURE — 71045 X-RAY EXAM CHEST 1 VIEW: CPT

## 2022-09-20 PROCEDURE — G0378 HOSPITAL OBSERVATION PER HR: HCPCS

## 2022-09-20 PROCEDURE — P9612 CATHETERIZE FOR URINE SPEC: HCPCS

## 2022-09-20 PROCEDURE — 85025 COMPLETE CBC W/AUTO DIFF WBC: CPT | Performed by: PHYSICIAN ASSISTANT

## 2022-09-20 PROCEDURE — 85652 RBC SED RATE AUTOMATED: CPT | Performed by: PHYSICIAN ASSISTANT

## 2022-09-20 PROCEDURE — G0180 MD CERTIFICATION HHA PATIENT: HCPCS | Performed by: ORTHOPAEDIC SURGERY

## 2022-09-20 PROCEDURE — 81001 URINALYSIS AUTO W/SCOPE: CPT | Performed by: PHYSICIAN ASSISTANT

## 2022-09-20 PROCEDURE — 86140 C-REACTIVE PROTEIN: CPT | Performed by: PHYSICIAN ASSISTANT

## 2022-09-20 PROCEDURE — 83605 ASSAY OF LACTIC ACID: CPT | Performed by: PHYSICIAN ASSISTANT

## 2022-09-20 PROCEDURE — 96365 THER/PROPH/DIAG IV INF INIT: CPT

## 2022-09-20 PROCEDURE — 87040 BLOOD CULTURE FOR BACTERIA: CPT | Performed by: PHYSICIAN ASSISTANT

## 2022-09-20 PROCEDURE — 25010000002 CEFTRIAXONE PER 250 MG: Performed by: PHYSICIAN ASSISTANT

## 2022-09-20 PROCEDURE — 99284 EMERGENCY DEPT VISIT MOD MDM: CPT

## 2022-09-20 RX ORDER — SODIUM CHLORIDE 0.9 % (FLUSH) 0.9 %
10 SYRINGE (ML) INJECTION AS NEEDED
Status: DISCONTINUED | OUTPATIENT
Start: 2022-09-20 | End: 2022-09-23 | Stop reason: HOSPADM

## 2022-09-20 RX ADMIN — SODIUM CHLORIDE 1000 ML: 9 INJECTION, SOLUTION INTRAVENOUS at 23:06

## 2022-09-20 RX ADMIN — CEFTRIAXONE 2 G: 2 INJECTION, POWDER, FOR SOLUTION INTRAMUSCULAR; INTRAVENOUS at 23:06

## 2022-09-20 NOTE — HOME HEALTH
SUBJECTIVE: Patient reports slow steady improvement, discussion of upcoming DC and progression to OP. Patient gives verbal understanding. F/U with Urologist is tomorrow    SIGNS/SYMPTOMS OF INFECTION: No    FALLS SINCE LAST VISIT: No    EDEMA: Moderate non-pitting. Calf soft and non-tender    WOUNDS: Dressing intact, no new drainage.

## 2022-09-21 ENCOUNTER — TELEPHONE (OUTPATIENT)
Dept: ORTHOPEDIC SURGERY | Facility: CLINIC | Age: 72
End: 2022-09-21

## 2022-09-21 ENCOUNTER — HOME CARE VISIT (OUTPATIENT)
Dept: HOME HEALTH SERVICES | Facility: HOME HEALTHCARE | Age: 72
End: 2022-09-21

## 2022-09-21 VITALS
SYSTOLIC BLOOD PRESSURE: 137 MMHG | OXYGEN SATURATION: 97 % | TEMPERATURE: 97.3 F | RESPIRATION RATE: 18 BRPM | HEART RATE: 76 BPM | DIASTOLIC BLOOD PRESSURE: 84 MMHG

## 2022-09-21 LAB
ANION GAP SERPL CALCULATED.3IONS-SCNC: 9 MMOL/L (ref 5–15)
BASOPHILS # BLD AUTO: 0.02 10*3/MM3 (ref 0–0.2)
BASOPHILS NFR BLD AUTO: 0.4 % (ref 0–1.5)
BUN SERPL-MCNC: 14 MG/DL (ref 8–23)
BUN/CREAT SERPL: 21.2 (ref 7–25)
CALCIUM SPEC-SCNC: 8 MG/DL (ref 8.6–10.5)
CHLORIDE SERPL-SCNC: 102 MMOL/L (ref 98–107)
CHOLEST SERPL-MCNC: 104 MG/DL (ref 0–200)
CO2 SERPL-SCNC: 26 MMOL/L (ref 22–29)
CREAT SERPL-MCNC: 0.66 MG/DL (ref 0.76–1.27)
DEPRECATED RDW RBC AUTO: 38.8 FL (ref 37–54)
EGFRCR SERPLBLD CKD-EPI 2021: 99 ML/MIN/1.73
EOSINOPHIL # BLD AUTO: 0.16 10*3/MM3 (ref 0–0.4)
EOSINOPHIL NFR BLD AUTO: 2.9 % (ref 0.3–6.2)
ERYTHROCYTE [DISTWIDTH] IN BLOOD BY AUTOMATED COUNT: 12.2 % (ref 12.3–15.4)
GLUCOSE BLDC GLUCOMTR-MCNC: 159 MG/DL (ref 70–130)
GLUCOSE BLDC GLUCOMTR-MCNC: 172 MG/DL (ref 70–130)
GLUCOSE SERPL-MCNC: 159 MG/DL (ref 65–99)
HCT VFR BLD AUTO: 26.1 % (ref 37.5–51)
HDLC SERPL-MCNC: 24 MG/DL (ref 40–60)
HGB BLD-MCNC: 9 G/DL (ref 13–17.7)
IMM GRANULOCYTES # BLD AUTO: 0.02 10*3/MM3 (ref 0–0.05)
IMM GRANULOCYTES NFR BLD AUTO: 0.4 % (ref 0–0.5)
LDLC SERPL CALC-MCNC: 64 MG/DL (ref 0–100)
LDLC/HDLC SERPL: 2.71 {RATIO}
LYMPHOCYTES # BLD AUTO: 1.06 10*3/MM3 (ref 0.7–3.1)
LYMPHOCYTES NFR BLD AUTO: 19.2 % (ref 19.6–45.3)
MCH RBC QN AUTO: 29.6 PG (ref 26.6–33)
MCHC RBC AUTO-ENTMCNC: 34.5 G/DL (ref 31.5–35.7)
MCV RBC AUTO: 85.9 FL (ref 79–97)
MONOCYTES # BLD AUTO: 0.66 10*3/MM3 (ref 0.1–0.9)
MONOCYTES NFR BLD AUTO: 11.9 % (ref 5–12)
NEUTROPHILS NFR BLD AUTO: 3.61 10*3/MM3 (ref 1.7–7)
NEUTROPHILS NFR BLD AUTO: 65.2 % (ref 42.7–76)
NRBC BLD AUTO-RTO: 0 /100 WBC (ref 0–0.2)
NT-PROBNP SERPL-MCNC: 144 PG/ML (ref 0–900)
PLATELET # BLD AUTO: 269 10*3/MM3 (ref 140–450)
PMV BLD AUTO: 9.5 FL (ref 6–12)
POTASSIUM SERPL-SCNC: 3.6 MMOL/L (ref 3.5–5.2)
RBC # BLD AUTO: 3.04 10*6/MM3 (ref 4.14–5.8)
SODIUM SERPL-SCNC: 137 MMOL/L (ref 136–145)
TRIGL SERPL-MCNC: 75 MG/DL (ref 0–150)
VLDLC SERPL-MCNC: 16 MG/DL (ref 5–40)
WBC NRBC COR # BLD: 5.53 10*3/MM3 (ref 3.4–10.8)

## 2022-09-21 PROCEDURE — 25010000002 CEFTRIAXONE PER 250 MG: Performed by: HOSPITALIST

## 2022-09-21 PROCEDURE — G0378 HOSPITAL OBSERVATION PER HR: HCPCS

## 2022-09-21 PROCEDURE — 63710000001 INSULIN LISPRO (HUMAN) PER 5 UNITS: Performed by: HOSPITALIST

## 2022-09-21 PROCEDURE — 85025 COMPLETE CBC W/AUTO DIFF WBC: CPT | Performed by: HOSPITALIST

## 2022-09-21 PROCEDURE — 80061 LIPID PANEL: CPT | Performed by: HOSPITALIST

## 2022-09-21 PROCEDURE — 82962 GLUCOSE BLOOD TEST: CPT

## 2022-09-21 PROCEDURE — 80048 BASIC METABOLIC PNL TOTAL CA: CPT | Performed by: HOSPITALIST

## 2022-09-21 PROCEDURE — 83880 ASSAY OF NATRIURETIC PEPTIDE: CPT | Performed by: HOSPITALIST

## 2022-09-21 PROCEDURE — 96361 HYDRATE IV INFUSION ADD-ON: CPT

## 2022-09-21 PROCEDURE — 96366 THER/PROPH/DIAG IV INF ADDON: CPT

## 2022-09-21 RX ORDER — INSULIN LISPRO 100 [IU]/ML
0-7 INJECTION, SOLUTION INTRAVENOUS; SUBCUTANEOUS
Status: DISCONTINUED | OUTPATIENT
Start: 2022-09-21 | End: 2022-09-23 | Stop reason: HOSPADM

## 2022-09-21 RX ORDER — PANTOPRAZOLE SODIUM 40 MG/1
40 TABLET, DELAYED RELEASE ORAL
Status: DISCONTINUED | OUTPATIENT
Start: 2022-09-21 | End: 2022-09-23 | Stop reason: HOSPADM

## 2022-09-21 RX ORDER — ASPIRIN 81 MG/1
81 TABLET, CHEWABLE ORAL DAILY
Status: DISCONTINUED | OUTPATIENT
Start: 2022-09-21 | End: 2022-09-23 | Stop reason: HOSPADM

## 2022-09-21 RX ORDER — AMLODIPINE BESYLATE 5 MG/1
5 TABLET ORAL
Status: DISCONTINUED | OUTPATIENT
Start: 2022-09-21 | End: 2022-09-23 | Stop reason: HOSPADM

## 2022-09-21 RX ORDER — INSULIN LISPRO 100 [IU]/ML
0-7 INJECTION, SOLUTION INTRAVENOUS; SUBCUTANEOUS
Status: DISCONTINUED | OUTPATIENT
Start: 2022-09-21 | End: 2022-09-21

## 2022-09-21 RX ORDER — NITROGLYCERIN 0.4 MG/1
0.4 TABLET SUBLINGUAL
Status: DISCONTINUED | OUTPATIENT
Start: 2022-09-21 | End: 2022-09-22

## 2022-09-21 RX ORDER — TAMSULOSIN HYDROCHLORIDE 0.4 MG/1
0.4 CAPSULE ORAL 2 TIMES DAILY
Status: DISCONTINUED | OUTPATIENT
Start: 2022-09-21 | End: 2022-09-23 | Stop reason: HOSPADM

## 2022-09-21 RX ORDER — TAMSULOSIN HYDROCHLORIDE 0.4 MG/1
0.4 CAPSULE ORAL 2 TIMES DAILY
COMMUNITY
Start: 2022-09-15

## 2022-09-21 RX ORDER — SODIUM CHLORIDE 9 MG/ML
75 INJECTION, SOLUTION INTRAVENOUS CONTINUOUS
Status: DISCONTINUED | OUTPATIENT
Start: 2022-09-21 | End: 2022-09-22

## 2022-09-21 RX ADMIN — ASPIRIN 81 MG: 81 TABLET, CHEWABLE ORAL at 09:19

## 2022-09-21 RX ADMIN — INSULIN LISPRO 2 UNITS: 100 INJECTION, SOLUTION INTRAVENOUS; SUBCUTANEOUS at 18:44

## 2022-09-21 RX ADMIN — PANTOPRAZOLE SODIUM 40 MG: 40 TABLET, DELAYED RELEASE ORAL at 05:52

## 2022-09-21 RX ADMIN — TAMSULOSIN HYDROCHLORIDE 0.4 MG: 0.4 CAPSULE ORAL at 20:31

## 2022-09-21 RX ADMIN — CEFTRIAXONE SODIUM 1 G: 1 INJECTION, POWDER, FOR SOLUTION INTRAMUSCULAR; INTRAVENOUS at 21:24

## 2022-09-21 RX ADMIN — SODIUM CHLORIDE 75 ML/HR: 9 INJECTION, SOLUTION INTRAVENOUS at 01:25

## 2022-09-21 RX ADMIN — AMLODIPINE BESYLATE 5 MG: 5 TABLET ORAL at 09:19

## 2022-09-21 RX ADMIN — SODIUM CHLORIDE 75 ML/HR: 9 INJECTION, SOLUTION INTRAVENOUS at 14:45

## 2022-09-21 NOTE — ED NOTES
"..  Nursing report ED to floor  Everton Murguia  72 y.o.  male    HPI :   Chief Complaint   Patient presents with   • Urinary Retention       Admitting doctor:   Damon Johnson MD    Admitting diagnosis:   The primary encounter diagnosis was Acute UTI. Diagnoses of Bacteremia, Urinary retention, and Hx of total knee arthroplasty, left were also pertinent to this visit.    Code status:   Current Code Status     Date Active Code Status Order ID Comments User Context       Prior    Advance Care Planning Activity          Allergies:   Patient has no known allergies.    Isolation:   No active isolations    Intake and Output    Intake/Output Summary (Last 24 hours) at 9/20/2022 2338  Last data filed at 9/20/2022 2209  Gross per 24 hour   Intake --   Output 800 ml   Net -800 ml       Weight:   There were no vitals filed for this visit.    Most recent vitals:   Vitals:    09/20/22 2112 09/20/22 2113   BP:  161/79   Pulse: 82    Resp: 18    Temp: 100.4 °F (38 °C)    TempSrc: Tympanic    SpO2: 94%    Height: 185.4 cm (73\")        Active LDAs/IV Access:   Lines, Drains & Airways     Active LDAs     Name Placement date Placement time Site Days    Peripheral IV 09/20/22 2207 Left Antecubital 09/20/22 2207  Antecubital  less than 1    Urethral Catheter Non-latex 16 Fr. 09/20/22 2209  -- less than 1                Labs (abnormal labs have a star):   Labs Reviewed   COMPREHENSIVE METABOLIC PANEL - Abnormal; Notable for the following components:       Result Value    Glucose 154 (*)     Sodium 130 (*)     Chloride 94 (*)     AST (SGOT) 56 (*)     All other components within normal limits    Narrative:     GFR Normal >60  Chronic Kidney Disease <60  Kidney Failure <15     URINALYSIS W/ MICROSCOPIC IF INDICATED (NO CULTURE) - Abnormal; Notable for the following components:    Color, UA Dark Yellow (*)     Appearance, UA Turbid (*)     Blood, UA Large (3+) (*)     Protein, UA 30 mg/dL (1+) (*)     Leuk Esterase, UA Large (3+) (*)     " "Nitrite, UA Positive (*)     Urobilinogen, UA 2.0 E.U./dL (*)     All other components within normal limits   PROCALCITONIN - Abnormal; Notable for the following components:    Procalcitonin 0.38 (*)     All other components within normal limits    Narrative:     As a Marker for Sepsis (Non-Neonates):    1. <0.5 ng/mL represents a low risk of severe sepsis and/or septic shock.  2. >2 ng/mL represents a high risk of severe sepsis and/or septic shock.    As a Marker for Lower Respiratory Tract Infections that require antibiotic therapy:    PCT on Admission    Antibiotic Therapy       6-12 Hrs later    >0.5                Strongly Recommended  >0.25 - <0.5        Recommended   0.1 - 0.25          Discouraged              Remeasure/reassess PCT  <0.1                Strongly Discouraged     Remeasure/reassess PCT    As 28 day mortality risk marker: \"Change in Procalcitonin Result\" (>80% or <=80%) if Day 0 (or Day 1) and Day 4 values are available. Refer to http://[email or web address]-pct-calculator.[email or web address]    Change in PCT <=80%  A decrease of PCT levels below or equal to 80% defines a positive change in PCT test result representing a higher risk for 28-day all-cause mortality of patients diagnosed with severe sepsis for septic shock.    Change in PCT >80%  A decrease of PCT levels of more than 80% defines a negative change in PCT result representing a lower risk for 28-day all-cause mortality of patients diagnosed with severe sepsis or septic shock.      SEDIMENTATION RATE - Abnormal; Notable for the following components:    Sed Rate 47 (*)     All other components within normal limits   C-REACTIVE PROTEIN - Abnormal; Notable for the following components:    C-Reactive Protein 18.52 (*)     All other components within normal limits   CBC WITH AUTO DIFFERENTIAL - Abnormal; Notable for the following components:    RBC 3.36 (*)     Hemoglobin 9.9 (*)     Hematocrit 28.9 (*)     Lymphocyte % 11.7 (*)     All other components within " normal limits   URINALYSIS, MICROSCOPIC ONLY - Abnormal; Notable for the following components:    RBC, UA 13-20 (*)     WBC, UA Too Numerous to Count (*)     Bacteria, UA 4+ (*)     All other components within normal limits   LACTIC ACID, PLASMA - Normal   BLOOD CULTURE   BLOOD CULTURE   CBC AND DIFFERENTIAL    Narrative:     The following orders were created for panel order CBC & Differential.  Procedure                               Abnormality         Status                     ---------                               -----------         ------                     CBC Auto Differential[[id number]]        Abnormal            Final result                 Please view results for these tests on the individual orders.       EKG:   No orders to display       Meds given in ED:   Medications   sodium chloride 0.9 % flush 10 mL (has no administration in time range)   cefTRIAXone (ROCEPHIN) 2 g in sodium chloride 0.9 % 100 mL IVPB-VTB (2 g Intravenous New Bag 9/20/22 2306)   sodium chloride 0.9 % bolus 1,000 mL (1,000 mL Intravenous New Bag 9/20/22 2306)       Imaging results:  No radiology results for the last day    Ambulatory status:   - up with assist x2, walker in the home    Social issues:   Social History     Socioeconomic History   • Marital status:    Tobacco Use   • Smoking status: Never Smoker   • Smokeless tobacco: Never Used   Vaping Use   • Vaping Use: Never used   Substance and Sexual Activity   • Alcohol use: Never   • Drug use: Never       NIH Stroke Scale:         Justine Leonardo RN  09/20/22 23:38 EDT

## 2022-09-21 NOTE — CONSULTS
[facility]      Patient Identification:  NAME:  Everton Murguia  Age:  72 y.o.   Sex:  male   :  1950   MRN:  [id number]       Chief complaint: Can't urinate.      History of present illness:  H/o post op retention. VT at Dr. Tabares's office. Could not void last PM.   Martinez catheter was placed and was admitted. On Flomax. + UTI.        Past medical history:  Past Medical History:   Diagnosis Date   • Arthritis    • Hypertension    • Knee pain, bilateral    • Polio    • Type 2 diabetes mellitus, with long-term current use of insulin (HCC)        Past surgical history:  Past Surgical History:   Procedure Laterality Date   • TOTAL KNEE ARTHROPLASTY Left 2022    Procedure: TOTAL KNEE ARTHROPLASTY WITH CORI ROBOT;  Surgeon: Heriberto Rodríguez MD;  Location: [facility] OR [facility] [place];  Service: Orthopedics;  Laterality: Left;       Allergies:  Patient has no known allergies.    Home medications:  Medications Prior to Admission   Medication Sig Dispense Refill Last Dose   • amLODIPine (NORVASC) 5 MG tablet Take 5 mg by mouth Daily.   2022 at Unknown time   • aspirin 81 MG EC tablet Take 1 tablet by mouth 2 (Two) Times a Day for 14 days, THEN 1 tablet Daily for 28 days. 60 tablet 0 2022 at Unknown time   • HYDROcodone-acetaminophen (NORCO) 7.5-325 MG per tablet Take 1-2 tablets by mouth Every 4 (Four) to 6 (Six) Hours As Needed for pain. Take 2 only when in severe pain. 60 tablet 0 Past Week at Unknown time   • metFORMIN (GLUCOPHAGE) 500 MG tablet Take 500 mg by mouth 2 (Two) Times a Day With Meals.   2022 at Unknown time   • ondansetron (Zofran) 4 MG tablet Take 1 tablet by mouth Every 8 (Eight) Hours As Needed for Nausea or Vomiting for up to 10 doses. 10 tablet 0 Past Week at Unknown time   • pantoprazole (PROTONIX) 40 MG EC tablet Take 1 tablet by mouth Daily for 14 days. 14 tablet 0 2022 at Unknown time   • meloxicam (MOBIC) 15 MG tablet Take 1 tablet by mouth Daily for 14 days 14 tablet  0         Hospital medications:  amLODIPine, 5 mg, Oral, Q24H  aspirin, 81 mg, Oral, Daily  cefTRIAXone, 1 g, Intravenous, Q24H  insulin lispro, 0-7 Units, Subcutaneous, 4x Daily With Meals & Nightly  pantoprazole, 40 mg, Oral, Q AM      sodium chloride, 75 mL/hr, Last Rate: 75 mL/hr (22 1445)      nitroglycerin  •  [COMPLETED] Insert peripheral IV **AND** sodium chloride    Family history:  Family History   Problem Relation Age of Onset   • Malig Hyperthermia Neg Hx        Social history:  Social History     Tobacco Use   • Smoking status: Never Smoker   • Smokeless tobacco: Never Used   Vaping Use   • Vaping Use: Never used   Substance Use Topics   • Alcohol use: Never   • Drug use: Never       Review of systems:       Positive for:  Retention.    Negative for:  Fever.      Objective:  TMax 24 hours:   Temp (24hrs), Av °F (37.2 °C), Min:98.3 °F (36.8 °C), Max:100.4 °F (38 °C)      Vitals Ranges:   Temp:  [98.3 °F (36.8 °C)-100.4 °F (38 °C)] 98.3 °F (36.8 °C)  Heart Rate:  [64-82] 64  Resp:  [16-18] 16  BP: (123-161)/(72-83) 138/81    Intake/Output Last 3 shifts:  I/O last 3 completed shifts:  In: 1100 [IV Piggyback:1100]  Out: 2200 [Urine:2200]     Physical Exam:    General Appearance:    Alert, cooperative, NAD   HEENT:    No trauma, pupils reactive, hearing intact   Back:     No CVA tenderness   Lungs:     Respirations unlabored, no wheezing    Heart:    RRR.   Abdomen:     Soft, NDNT, no masses, no guarding   :       Extremities:   No edema, no deformity   Lymphatic:   No neck or groin LAD   Skin:   No bleeding, bruising or rashes   Neuro/Psych:   Orientation intact, mood/affect pleasant, no focal findings       Results review:   I reviewed the patient's new clinical results.    Data review:  Lab Results (last 24 hours)     Procedure Component Value Units Date/Time    POC Glucose Once [[id number]]  (Abnormal) Collected: 22    Specimen: Blood Updated: 22 161     Glucose 172 mg/dL       Comment: Meter: [id number] : [id number] Dominique LINDSAY       Lipid Panel [[id number]]  (Abnormal) Collected: 09/21/22 0739    Specimen: Blood Updated: 09/21/22 1047     Total Cholesterol 104 mg/dL      Triglycerides 75 mg/dL      HDL Cholesterol 24 mg/dL      LDL Cholesterol  64 mg/dL      VLDL Cholesterol 16 mg/dL      LDL/HDL Ratio 2.71    Narrative:      Cholesterol Reference Ranges  (U.S. Department of Health and Human Services ATP III Classifications)    Desirable          <200 mg/dL  Borderline High    200-239 mg/dL  High Risk          >240 mg/dL      Triglyceride Reference Ranges  (U.S. Department of Health and Human Services ATP III Classifications)    Normal           <150 mg/dL  Borderline High  150-199 mg/dL  High             200-499 mg/dL  Very High        >500 mg/dL    HDL Reference Ranges  (U.S. Department of Health and Human Services ATP III Classifications)    Low     <40 mg/dl (major risk factor for CHD)  High    >60 mg/dl ('negative' risk factor for CHD)        LDL Reference Ranges  (U.S. Department of Health and Human Services ATP III Classifications)    Optimal          <100 mg/dL  Near Optimal     100-129 mg/dL  Borderline High  130-159 mg/dL  High             160-189 mg/dL  Very High        >189 mg/dL    BNP [[id number]]  (Normal) Collected: 09/21/22 0739    Specimen: Blood Updated: 09/21/22 1044     proBNP 144.0 pg/mL     Narrative:      Among patients with dyspnea, NT-proBNP is highly sensitive for the detection of acute congestive heart failure. In addition NT-proBNP of <300 pg/ml effectively rules out acute congestive heart failure with 99% negative predictive value.    Results may be falsely decreased if patient taking Biotin.      Basic Metabolic Panel [[id number]]  (Abnormal) Collected: 09/21/22 0739    Specimen: Blood Updated: 09/21/22 0857     Glucose 159 mg/dL      BUN 14 mg/dL      Creatinine 0.66 mg/dL      Sodium 137 mmol/L      Potassium 3.6 mmol/L      Chloride 102 mmol/L       CO2 26.0 mmol/L      Calcium 8.0 mg/dL      BUN/Creatinine Ratio 21.2     Anion Gap 9.0 mmol/L      eGFR 99.0 mL/min/1.73      Comment: National Kidney Foundation and American Society of Nephrology (ASN) Task Force recommended calculation based on the Chronic Kidney Disease Epidemiology Collaboration (CKD-EPI) equation refit without adjustment for race.       Narrative:      GFR Normal >60  Chronic Kidney Disease <60  Kidney Failure <15      CBC & Differential [[id number]]  (Abnormal) Collected: 09/21/22 0739    Specimen: Blood Updated: 09/21/22 0834    Narrative:      The following orders were created for panel order CBC & Differential.  Procedure                               Abnormality         Status                     ---------                               -----------         ------                     CBC Auto Differential[[id number]]        Abnormal            Final result                 Please view results for these tests on the individual orders.    CBC Auto Differential [[id number]]  (Abnormal) Collected: 09/21/22 0739    Specimen: Blood Updated: 09/21/22 0834     WBC 5.53 10*3/mm3      RBC 3.04 10*6/mm3      Hemoglobin 9.0 g/dL      Hematocrit 26.1 %      MCV 85.9 fL      MCH 29.6 pg      MCHC 34.5 g/dL      RDW 12.2 %      RDW-SD 38.8 fl      MPV 9.5 fL      Platelets 269 10*3/mm3      Neutrophil % 65.2 %      Lymphocyte % 19.2 %      Monocyte % 11.9 %      Eosinophil % 2.9 %      Basophil % 0.4 %      Immature Grans % 0.4 %      Neutrophils, Absolute 3.61 10*3/mm3      Lymphocytes, Absolute 1.06 10*3/mm3      Monocytes, Absolute 0.66 10*3/mm3      Eosinophils, Absolute 0.16 10*3/mm3      Basophils, Absolute 0.02 10*3/mm3      Immature Grans, Absolute 0.02 10*3/mm3      nRBC 0.0 /100 WBC     Blood Culture - Blood, Arm, Right [[id number]] Collected: 09/20/22 2300    Specimen: Blood from Arm, Right Updated: 09/20/22 2317    Procalcitonin [[id number]]  (Abnormal) Collected: 09/20/22 2149    Specimen:  "Blood Updated: 09/20/22 2243     Procalcitonin 0.38 ng/mL     Narrative:      As a Marker for Sepsis (Non-Neonates):    1. <0.5 ng/mL represents a low risk of severe sepsis and/or septic shock.  2. >2 ng/mL represents a high risk of severe sepsis and/or septic shock.    As a Marker for Lower Respiratory Tract Infections that require antibiotic therapy:    PCT on Admission    Antibiotic Therapy       6-12 Hrs later    >0.5                Strongly Recommended  >0.25 - <0.5        Recommended   0.1 - 0.25          Discouraged              Remeasure/reassess PCT  <0.1                Strongly Discouraged     Remeasure/reassess PCT    As 28 day mortality risk marker: \"Change in Procalcitonin Result\" (>80% or <=80%) if Day 0 (or Day 1) and Day 4 values are available. Refer to http://www.[employer] Mediapct-calculator.com    Change in PCT <=80%  A decrease of PCT levels below or equal to 80% defines a positive change in PCT test result representing a higher risk for 28-day all-cause mortality of patients diagnosed with severe sepsis for septic shock.    Change in PCT >80%  A decrease of PCT levels of more than 80% defines a negative change in PCT result representing a lower risk for 28-day all-cause mortality of patients diagnosed with severe sepsis or septic shock.       Comprehensive Metabolic Panel [[id number]]  (Abnormal) Collected: 09/20/22 2149    Specimen: Blood Updated: 09/20/22 2238     Glucose 154 mg/dL      BUN 17 mg/dL      Creatinine 0.77 mg/dL      Sodium 130 mmol/L      Potassium 4.0 mmol/L      Chloride 94 mmol/L      CO2 25.7 mmol/L      Calcium 8.8 mg/dL      Total Protein 6.8 g/dL      Albumin 3.50 g/dL      ALT (SGPT) 23 U/L      AST (SGOT) 56 U/L      Alkaline Phosphatase 87 U/L      Total Bilirubin 0.9 mg/dL      Globulin 3.3 gm/dL      A/G Ratio 1.1 g/dL      BUN/Creatinine Ratio 22.1     Anion Gap 10.3 mmol/L      eGFR 94.5 mL/min/1.73      Comment: National Kidney Foundation and American Society of Nephrology " (ASN) Task Force recommended calculation based on the Chronic Kidney Disease Epidemiology Collaboration (CKD-EPI) equation refit without adjustment for race.       Narrative:      GFR Normal >60  Chronic Kidney Disease <60  Kidney Failure <15      C-reactive Protein [[id number]]  (Abnormal) Collected: 09/20/22 2149    Specimen: Blood Updated: 09/20/22 2238     C-Reactive Protein 18.52 mg/dL     Urinalysis, Microscopic Only - Urine, Catheter [[id number]]  (Abnormal) Collected: 09/20/22 2202    Specimen: Urine, Catheter Updated: 09/20/22 2233     RBC, UA 13-20 /HPF      WBC, UA Too Numerous to Count /HPF      Bacteria, UA 4+ /HPF      Squamous Epithelial Cells, UA 0-2 /HPF      Yeast, UA Small/1+ Yeast /HPF      Hyaline Casts, UA 3-6 /LPF      Methodology Manual Light Microscopy    Lactic Acid, Plasma [[id number]]  (Normal) Collected: 09/20/22 2149    Specimen: Blood Updated: 09/20/22 2230     Lactate 1.1 mmol/L     Urinalysis With Microscopic If Indicated (No Culture) - Urine, Catheter [[id number]]  (Abnormal) Collected: 09/20/22 2202    Specimen: Urine, Catheter Updated: 09/20/22 2218     Color, UA Dark Yellow     Appearance, UA Turbid     pH, UA 5.5     Specific Gravity, UA 1.015     Glucose, UA Negative     Ketones, UA Negative     Bilirubin, UA Negative     Blood, UA Large (3+)     Protein, UA 30 mg/dL (1+)     Leuk Esterase, UA Large (3+)     Nitrite, UA Positive     Urobilinogen, UA 2.0 E.U./dL    Sedimentation Rate [[id number]]  (Abnormal) Collected: 09/20/22 2149    Specimen: Blood Updated: 09/20/22 2216     Sed Rate 47 mm/hr     CBC & Differential [[id number]]  (Abnormal) Collected: 09/20/22 2149    Specimen: Blood Updated: 09/20/22 2213    Narrative:      The following orders were created for panel order CBC & Differential.  Procedure                               Abnormality         Status                     ---------                               -----------         ------                     CBC Auto  Differential[[id number]]        Abnormal            Final result                 Please view results for these tests on the individual orders.    CBC Auto Differential [[id number]]  (Abnormal) Collected: 09/20/22 2149    Specimen: Blood Updated: 09/20/22 2213     WBC 8.22 10*3/mm3      RBC 3.36 10*6/mm3      Hemoglobin 9.9 g/dL      Hematocrit 28.9 %      MCV 86.0 fL      MCH 29.5 pg      MCHC 34.3 g/dL      RDW 12.5 %      RDW-SD 39.1 fl      MPV 9.5 fL      Platelets 285 10*3/mm3      Neutrophil % 75.4 %      Lymphocyte % 11.7 %      Monocyte % 10.7 %      Eosinophil % 1.6 %      Basophil % 0.2 %      Immature Grans % 0.4 %      Neutrophils, Absolute 6.20 10*3/mm3      Lymphocytes, Absolute 0.96 10*3/mm3      Monocytes, Absolute 0.88 10*3/mm3      Eosinophils, Absolute 0.13 10*3/mm3      Basophils, Absolute 0.02 10*3/mm3      Immature Grans, Absolute 0.03 10*3/mm3      nRBC 0.0 /100 WBC     Blood Culture - Blood, Arm, Left [[id number]] Collected: 09/20/22 2149    Specimen: Blood from Arm, Left Updated: 09/20/22 2209           Imaging:  Imaging Results (Last 24 Hours)     Procedure Component Value Units Date/Time    XR Chest 1 View [[id number]] Collected: 09/20/22 2208     Updated: 09/20/22 2212    Narrative:      XR CHEST 1 VW-clinical: Fever of unknown origin     FINDINGS: Atherosclerotic calcification of the aorta. Mild cardiac  enlargement. The left lung is clear. Mild elevation/eventration of the  right hemidiaphragm. The right lung is clear. No effusion or edema seen.     CONCLUSION: No acute pulmonary process is demonstrated.     This report was finalized on 9/20/2022 10:09 PM by Dr. Zach Huang M.D.                Assessment:       Acute UTI    Urinary retention.  UTI.      Plan:     Cont hathaway on d/c. Cont Flomax.  Await ucx.  F/u Dr. Tabares next week for VT.  Has appt.      Joseph Espinoza MD  09/21/22  16:29 EDT

## 2022-09-21 NOTE — ED TRIAGE NOTES
Pt to ER from home via PV with c/o being unable to urinate. Pt states he had an indwelling catheter removed by [facility] at approx 2pm today and has been unable to void since then. Pt does report bladder fullness.       Pt masked in triage, staff in appropriate ppe.

## 2022-09-21 NOTE — H&P
History and physical    Primary care physician      Chief complaint  Urinary retention    History of present illness  72 years old white male with history of hypertension diabetes mellitus osteoarthritis who recently underwent left total knee arthroplasty and have urinary retention followed by urology and treated with Martinez catheter which was discontinued yesterday presented to [facility] emergency room with unable to void with lower abdominal discomfort and low-grade fever.  Patient work-up in ER revealed acute UTI with urine retention admit for management.  Patient denies any chest pain shortness of breath abdominal pain nausea vomiting diarrhea.     PAST MEDICAL HISTORY  • Arthritis     • Hypertension     • Knee pain, bilateral     • Polio     • Diabetes mellitus        PAST SURGICAL HISTORY              Procedure Laterality Date   • TOTAL KNEE ARTHROPLASTY Left 9/7/2022     Procedure: TOTAL KNEE ARTHROPLASTY WITH CORI ROBOT;  Surgeon: Heriberto Rodríguez MD;  Location: [facility];  Service: Orthopedics;  Laterality: Left;         FAMILY HISTORY           Problem Relation Age of Onset   • Malig Hyperthermia Neg Hx        SOCIAL HISTORY                Socioeconomic History   • Marital status:    Tobacco Use   • Smoking status: Never Smoker   • Smokeless tobacco: Never Used   Vaping Use   • Vaping Use: Never used   Substance and Sexual Activity   • Alcohol use: Never   • Drug use: Never         ALLERGIES  Patient has no known allergies.  Home medications reviewed     REVIEW OF SYSTEMS  Constitutional: Positive for fever. Negative for chills.   HENT: Negative.    Eyes: Negative.    Respiratory: Negative for cough and shortness of breath.    Cardiovascular: Negative for chest pain and palpitations.   Gastrointestinal: Negative for abdominal pain.   Genitourinary: Positive for difficulty urinating.   Musculoskeletal: Negative for neck pain.   Skin: Negative.    Neurological: Negative.   "  Psychiatric/Behavioral: Negative.       PHYSICAL EXAM   Blood pressure 123/74, pulse 64, temperature 98.4 °F (36.9 °C), temperature source Oral, resp. rate 16, height 185.4 cm (73\"), weight 92.9 kg (204 lb 14.4 oz), SpO2 95 %.    GENERAL: Well-nourished, nontoxic it does appear slightly uncomfortable  HEENT:  Unremarkable  NECK:  Supple  CV: regular rhythm, regular rate  RESPIRATORY  normal effort moving air bilaterally  ABDOMEN:  Soft nontender nondistended bowel sounds positive  MUSCULOSKELETAL: no deformity  NEURO: alert, moves all extremities, follows commands  SKIN:  Warm     LAB RESULTS  Lab Results (last 24 hours)     Procedure Component Value Units Date/Time    Lipid Panel [[id number]]  (Abnormal) Collected: 09/21/22 0739    Specimen: Blood Updated: 09/21/22 1047     Total Cholesterol 104 mg/dL      Triglycerides 75 mg/dL      HDL Cholesterol 24 mg/dL      LDL Cholesterol  64 mg/dL      VLDL Cholesterol 16 mg/dL      LDL/HDL Ratio 2.71    Narrative:      Cholesterol Reference Ranges  (U.S. Department of Health and Human Services ATP III Classifications)    Desirable          <200 mg/dL  Borderline High    200-239 mg/dL  High Risk          >240 mg/dL      Triglyceride Reference Ranges  (U.S. Department of Health and Human Services ATP III Classifications)    Normal           <150 mg/dL  Borderline High  150-199 mg/dL  High             200-499 mg/dL  Very High        >500 mg/dL    HDL Reference Ranges  (U.S. Department of Health and Human Services ATP III Classifications)    Low     <40 mg/dl (major risk factor for CHD)  High    >60 mg/dl ('negative' risk factor for CHD)        LDL Reference Ranges  (U.S. Department of Health and Human Services ATP III Classifications)    Optimal          <100 mg/dL  Near Optimal     100-129 mg/dL  Borderline High  130-159 mg/dL  High             160-189 mg/dL  Very High        >189 mg/dL    BNP [[id number]]  (Normal) Collected: 09/21/22 0739    Specimen: Blood Updated: " 09/21/22 1044     proBNP 144.0 pg/mL     Narrative:      Among patients with dyspnea, NT-proBNP is highly sensitive for the detection of acute congestive heart failure. In addition NT-proBNP of <300 pg/ml effectively rules out acute congestive heart failure with 99% negative predictive value.    Results may be falsely decreased if patient taking Biotin.      Basic Metabolic Panel [[id number]]  (Abnormal) Collected: 09/21/22 0739    Specimen: Blood Updated: 09/21/22 0857     Glucose 159 mg/dL      BUN 14 mg/dL      Creatinine 0.66 mg/dL      Sodium 137 mmol/L      Potassium 3.6 mmol/L      Chloride 102 mmol/L      CO2 26.0 mmol/L      Calcium 8.0 mg/dL      BUN/Creatinine Ratio 21.2     Anion Gap 9.0 mmol/L      eGFR 99.0 mL/min/1.73      Comment: National Kidney Foundation and American Society of Nephrology (ASN) Task Force recommended calculation based on the Chronic Kidney Disease Epidemiology Collaboration (CKD-EPI) equation refit without adjustment for race.       Narrative:      GFR Normal >60  Chronic Kidney Disease <60  Kidney Failure <15      CBC & Differential [[id number]]  (Abnormal) Collected: 09/21/22 0739    Specimen: Blood Updated: 09/21/22 0834    Narrative:      The following orders were created for panel order CBC & Differential.  Procedure                               Abnormality         Status                     ---------                               -----------         ------                     CBC Auto Differential[[id number]]        Abnormal            Final result                 Please view results for these tests on the individual orders.    CBC Auto Differential [[id number]]  (Abnormal) Collected: 09/21/22 0739    Specimen: Blood Updated: 09/21/22 0834     WBC 5.53 10*3/mm3      RBC 3.04 10*6/mm3      Hemoglobin 9.0 g/dL      Hematocrit 26.1 %      MCV 85.9 fL      MCH 29.6 pg      MCHC 34.5 g/dL      RDW 12.2 %      RDW-SD 38.8 fl      MPV 9.5 fL      Platelets 269 10*3/mm3      Neutrophil  "% 65.2 %      Lymphocyte % 19.2 %      Monocyte % 11.9 %      Eosinophil % 2.9 %      Basophil % 0.4 %      Immature Grans % 0.4 %      Neutrophils, Absolute 3.61 10*3/mm3      Lymphocytes, Absolute 1.06 10*3/mm3      Monocytes, Absolute 0.66 10*3/mm3      Eosinophils, Absolute 0.16 10*3/mm3      Basophils, Absolute 0.02 10*3/mm3      Immature Grans, Absolute 0.02 10*3/mm3      nRBC 0.0 /100 WBC     Blood Culture - Blood, Arm, Right [[id number]] Collected: 09/20/22 2300    Specimen: Blood from Arm, Right Updated: 09/20/22 2317    Procalcitonin [[id number]]  (Abnormal) Collected: 09/20/22 2149    Specimen: Blood Updated: 09/20/22 2243     Procalcitonin 0.38 ng/mL     Narrative:      As a Marker for Sepsis (Non-Neonates):    1. <0.5 ng/mL represents a low risk of severe sepsis and/or septic shock.  2. >2 ng/mL represents a high risk of severe sepsis and/or septic shock.    As a Marker for Lower Respiratory Tract Infections that require antibiotic therapy:    PCT on Admission    Antibiotic Therapy       6-12 Hrs later    >0.5                Strongly Recommended  >0.25 - <0.5        Recommended   0.1 - 0.25          Discouraged              Remeasure/reassess PCT  <0.1                Strongly Discouraged     Remeasure/reassess PCT    As 28 day mortality risk marker: \"Change in Procalcitonin Result\" (>80% or <=80%) if Day 0 (or Day 1) and Day 4 values are available. Refer to http://www.[facility]-pct-calculator.com    Change in PCT <=80%  A decrease of PCT levels below or equal to 80% defines a positive change in PCT test result representing a higher risk for 28-day all-cause mortality of patients diagnosed with severe sepsis for septic shock.    Change in PCT >80%  A decrease of PCT levels of more than 80% defines a negative change in PCT result representing a lower risk for 28-day all-cause mortality of patients diagnosed with severe sepsis or septic shock.       Comprehensive Metabolic Panel [[id number]]  (Abnormal) " Collected: 09/20/22 2149    Specimen: Blood Updated: 09/20/22 2238     Glucose 154 mg/dL      BUN 17 mg/dL      Creatinine 0.77 mg/dL      Sodium 130 mmol/L      Potassium 4.0 mmol/L      Chloride 94 mmol/L      CO2 25.7 mmol/L      Calcium 8.8 mg/dL      Total Protein 6.8 g/dL      Albumin 3.50 g/dL      ALT (SGPT) 23 U/L      AST (SGOT) 56 U/L      Alkaline Phosphatase 87 U/L      Total Bilirubin 0.9 mg/dL      Globulin 3.3 gm/dL      A/G Ratio 1.1 g/dL      BUN/Creatinine Ratio 22.1     Anion Gap 10.3 mmol/L      eGFR 94.5 mL/min/1.73      Comment: National Kidney Foundation and American Society of Nephrology (ASN) Task Force recommended calculation based on the Chronic Kidney Disease Epidemiology Collaboration (CKD-EPI) equation refit without adjustment for race.       Narrative:      GFR Normal >60  Chronic Kidney Disease <60  Kidney Failure <15      C-reactive Protein [[id number]]  (Abnormal) Collected: 09/20/22 2149    Specimen: Blood Updated: 09/20/22 2238     C-Reactive Protein 18.52 mg/dL     Urinalysis, Microscopic Only - Urine, Catheter [[id number]]  (Abnormal) Collected: 09/20/22 2202    Specimen: Urine, Catheter Updated: 09/20/22 2233     RBC, UA 13-20 /HPF      WBC, UA Too Numerous to Count /HPF      Bacteria, UA 4+ /HPF      Squamous Epithelial Cells, UA 0-2 /HPF      Yeast, UA Small/1+ Yeast /HPF      Hyaline Casts, UA 3-6 /LPF      Methodology Manual Light Microscopy    Lactic Acid, Plasma [[id number]]  (Normal) Collected: 09/20/22 2149    Specimen: Blood Updated: 09/20/22 2230     Lactate 1.1 mmol/L     Urinalysis With Microscopic If Indicated (No Culture) - Urine, Catheter [[id number]]  (Abnormal) Collected: 09/20/22 2202    Specimen: Urine, Catheter Updated: 09/20/22 2218     Color, UA Dark Yellow     Appearance, UA Turbid     pH, UA 5.5     Specific Gravity, UA 1.015     Glucose, UA Negative     Ketones, UA Negative     Bilirubin, UA Negative     Blood, UA Large (3+)     Protein, UA 30 mg/dL (1+)      Leuk Esterase, UA Large (3+)     Nitrite, UA Positive     Urobilinogen, UA 2.0 E.U./dL    Sedimentation Rate [[id number]]  (Abnormal) Collected: 09/20/22 2149    Specimen: Blood Updated: 09/20/22 2216     Sed Rate 47 mm/hr     CBC & Differential [[id number]]  (Abnormal) Collected: 09/20/22 2149    Specimen: Blood Updated: 09/20/22 2213    Narrative:      The following orders were created for panel order CBC & Differential.  Procedure                               Abnormality         Status                     ---------                               -----------         ------                     CBC Auto Differential[[id number]]        Abnormal            Final result                 Please view results for these tests on the individual orders.    CBC Auto Differential [[id number]]  (Abnormal) Collected: 09/20/22 2149    Specimen: Blood Updated: 09/20/22 2213     WBC 8.22 10*3/mm3      RBC 3.36 10*6/mm3      Hemoglobin 9.9 g/dL      Hematocrit 28.9 %      MCV 86.0 fL      MCH 29.5 pg      MCHC 34.3 g/dL      RDW 12.5 %      RDW-SD 39.1 fl      MPV 9.5 fL      Platelets 285 10*3/mm3      Neutrophil % 75.4 %      Lymphocyte % 11.7 %      Monocyte % 10.7 %      Eosinophil % 1.6 %      Basophil % 0.2 %      Immature Grans % 0.4 %      Neutrophils, Absolute 6.20 10*3/mm3      Lymphocytes, Absolute 0.96 10*3/mm3      Monocytes, Absolute 0.88 10*3/mm3      Eosinophils, Absolute 0.13 10*3/mm3      Basophils, Absolute 0.02 10*3/mm3      Immature Grans, Absolute 0.03 10*3/mm3      nRBC 0.0 /100 WBC     Blood Culture - Blood, Arm, Left [[id number]] Collected: 09/20/22 2149    Specimen: Blood from Arm, Left Updated: 09/20/22 2209        Imaging Results (Last 24 Hours)     Procedure Component Value Units Date/Time    XR Chest 1 View [[id number]] Collected: 09/20/22 2208     Updated: 09/20/22 2212    Narrative:      XR CHEST 1 VW-clinical: Fever of unknown origin     FINDINGS: Atherosclerotic calcification of the aorta. Mild  cardiac  enlargement. The left lung is clear. Mild elevation/eventration of the  right hemidiaphragm. The right lung is clear. No effusion or edema seen.     CONCLUSION: No acute pulmonary process is demonstrated.     This report was finalized on 9/20/2022 10:09 PM by Dr. Zach Huang M.D.             Current Facility-Administered Medications:   •  amLODIPine (NORVASC) tablet 5 mg, 5 mg, Oral, Q24H, Ginger Johnson MD, 5 mg at 09/21/22 0919  •  aspirin chewable tablet 81 mg, 81 mg, Oral, Daily, Ginger Johnson MD, 81 mg at 09/21/22 0919  •  cefTRIAXone (ROCEPHIN) 1 g in sodium chloride 0.9 % 100 mL IVPB-VTB, 1 g, Intravenous, Q24H, Ginger Johnson MD  •  nitroglycerin (NITROSTAT) SL tablet 0.4 mg, 0.4 mg, Sublingual, Q5 Min PRN, Ginger Johnson MD  •  pantoprazole (PROTONIX) EC tablet 40 mg, 40 mg, Oral, Q AM, Ginger Johnson MD, 40 mg at 09/21/22 0552  •  [COMPLETED] Insert peripheral IV, , , Once **AND** sodium chloride 0.9 % flush 10 mL, 10 mL, Intravenous, PRN, Delroy Mendoza III, PA  •  sodium chloride 0.9 % infusion, 75 mL/hr, Intravenous, Continuous, Ginger Johnson MD, Last Rate: 75 mL/hr at 09/21/22 1130, 75 mL/hr at 09/21/22 1130    Facility-Administered Medications Ordered in Other Encounters:   •  Chlorhexidine Gluconate Cloth 2 % pads, , Apply externally, BID, Heriberto Rodríguez MD    ASSESSMENT  Acute UTI  Urinary retention  Recent left total knee arthroplasty  Diabetes mellitus  Hypertension  Osteoarthritis  Gastroesophageal reflux disease    PLAN  Admit  IVF  IV antibiotics  Martinez catheter  Urology consult  Adjust home medications  Stress ulcer DVT prophylaxis   Supportive care  Patient is full code  Discussed with nursing staff  Follow closely further recommendation current hospital course    GINGER JOHNSON MD

## 2022-09-21 NOTE — CASE MANAGEMENT/SOCIAL WORK
Discharge Planning Assessment  [facility]     Patient Name: Everton Murguia  MRN: [id number]  Today's Date: 9/21/2022    Admit Date: 9/20/2022    Plan: Return home with spouse and [facility] following (current)   Discharge Needs Assessment     Row Name 09/21/22 1037       Living Environment    People in Home spouse    Name(s) of People in Home Maia Lopez    Current Living Arrangements home    Primary Care Provided by self    Provides Primary Care For no one    Family Caregiver if Needed spouse    Family Caregiver Names maia Lopez [phone]    Quality of Family Relationships helpful    Able to Return to Prior Arrangements yes       Resource/Environmental Concerns    Resource/Environmental Concerns none    Transportation Concerns none       Transition Planning    Patient/Family Anticipates Transition to home with family    Patient/Family Anticipated Services at Transition home health care    Transportation Anticipated family or friend will provide       Discharge Needs Assessment    Readmission Within the Last 30 Days no previous admission in last 30 days    Equipment Currently Used at Home walker, standard;grab bar;shower chair;cane, straight    Concerns to be Addressed basic needs    Anticipated Changes Related to Illness none    Equipment Needed After Discharge none    Discharge Facility/Level of Care Needs home with home health    Provided Post Acute Provider List? N/A    N/A Provider List Comment Is current with [facility]               Discharge Plan     Row Name 09/21/22 1038       Plan    Plan Return home with spouse and [facility] following (current)    Patient/Family in Agreement with Plan yes    Plan Comments Spoke with patient at bedside.  Patient lives with wife Jessica [phone], is IADL, has a cane, walker, grab bars in the BR and shower chair.  He is current with [facility] ([facility]) - spoke with Kiah, and has never been to SNF.  PCP is Dr. Justin Diaz and pharmacy is [facility] on [email or web address]  Wife drives and can assist  patient as needed.  He plans to return home at [place] with [facility] following.  Rich CRUZ              Continued Care and Services - Admitted Since 9/20/2022    Coordination has not been started for this encounter.     Selected Continued Care - Prior Encounters Includes selections from prior encounters from 6/22/2022 to 9/21/2022    Discharged on 9/8/2022 Admission date: 9/7/2022 - Discharge disposition: Home-Health Care [facility]    Home Medical Care     Service Provider Selected Services Address Phone Fax Patient Preferred     [facility] [address] [id number] [phone] --                    Expected Discharge Date and Time     Expected Discharge Date Expected Discharge Time    Sep 24, 2022          Demographic Summary     Row Name 09/21/22 1036       General Information    Admission Type observation    Arrived From home    Referral Source admission list    Reason for Consult discharge planning    Preferred Language English               Functional Status     Row Name 09/21/22 1036       Functional Status    Usual Activity Tolerance moderate    Current Activity Tolerance moderate       Functional Status, IADL    Medications independent    Meal Preparation assistive person  Wife    Housekeeping assistive person    Laundry assistive person    Shopping assistive person       Mental Status    General Appearance WDL WDL       Mental Status Summary    Recent Changes in Mental Status/Cognitive Functioning no changes                      Patient Forms    No documentation.                   Becky S. [place], RN

## 2022-09-21 NOTE — TELEPHONE ENCOUNTER
Caller: SUKHJINDER MCKINNEY    Relationship to patient: SPOUSE    Best call back number: [phone]    Patient is needing:   DR GABRIEL: 09 22 22 1ST POST OP APPT 09/21/2022 SPOUSE, SUKHJINDER CALLED TO ADVISE UNABLE TO MAKE THE 09 22 22 APPT - AS PATIENT, IS IN PATIENT AT [facility] FOR BAD UTI -  HAD TO CANCEL APPTMT FOR TOMORROW F - WANTED TO KNOW IF DR GABRIEL WOULD BE ABLE TO COME SEE HIM IN THE HOSPITAL, INSTEAD>>>  HUB ADVISED WOULD NOTIFY CLINICAL AND SOMEONE WOULD CALL HER BACK - CELL  [phone].

## 2022-09-21 NOTE — PLAN OF CARE
Goal Outcome Evaluation:  Plan of Care Reviewed With: patient        Progress: no change  Outcome Evaluation: Pt admitted this shift for UTI, Martinez in place, catheter care provided. IVF continues @ 75mL/hr. Resting well. Pt has had no complaints this shift. VSS. Will continue to monitor.

## 2022-09-21 NOTE — ED PROVIDER NOTES
MD ATTESTATION NOTE    The CARTER and I have discussed this patient's history, physical exam, and treatment plan.  I have reviewed the documentation and personally had a face to face interaction with the patient. I affirm the documentation and agree with the treatment and plan.  The attached note describes my personal findings.      I provided a substantive portion of the care of the patient.  I personally performed the physical exam in its entirety, and below are my findings.  For this patient encounter, the patient wore surgical mask, I wore full protective PPE including N95 and eye protection.      Brief HPI: Patient presents for urinary retention.  Patient had Martinez placed right after his knee replacement.  Patient had a removed today and unable to urinate.  No chest pain.  Had low-grade fever.    PHYSICAL EXAM  ED Triage Vitals   Temp Heart Rate Resp BP SpO2   09/20/22 2112 09/20/22 2112 09/20/22 2112 09/20/22 2113 09/20/22 2112   100.4 °F (38 °C) 82 18 161/79 94 %      Temp src Heart Rate Source Patient Position BP Location FiO2 (%)   09/20/22 2112 -- -- -- --   Tympanic             GENERAL: no acute distress  HENT: nares patent  EYES: no scleral icterus  CV: regular rhythm, normal rate  RESPIRATORY: normal effort  ABDOMEN: soft.  Suprapubic tenderness  MUSCULOSKELETAL: no deformity  NEURO: alert, moves all extremities, follows commands  PSYCH:  calm, cooperative  SKIN: warm, dry    Vital signs and nursing notes reviewed.        Plan: Martinez catheter.  Lab and urine evaluation       Steve Quiroz MD  09/20/22 1956

## 2022-09-21 NOTE — DISCHARGE PLACEMENT REQUEST
"Donovan Murguia (72 y.o. Male)             Date of Birth   1950    Social Security Number       Address   [address] [place] [address]    Home Phone   [phone]    MRN   [id number]       Restoration   [employer]    Marital Status                               Admission Date   9/20/22    Admission Type   Emergency    Admitting Provider   Damon Johnson MD    Attending Provider   Damon Johnson MD    Department, Room/Bed   [address], [address]/1       Discharge Date       Discharge Disposition       Discharge Destination                               Attending Provider: Damon Johnson MD    Allergies: No Known Allergies    Isolation: None   Infection: None   Code Status: CPR   Advance Care Planning Activity    Ht: 185.4 cm (73\")   Wt: 92.9 kg (204 lb 14.4 oz)    Admission Cmt: None   Principal Problem: None                Active Insurance as of 9/20/2022     Primary Coverage     Payor Plan Insurance Group Employer/Plan Group    HUMANA MEDICARE REPLACEMENT HUMANA MEDICARE REPLACEMENT [id number]     Payor Plan Address Payor Plan Phone Number Payor Plan Fax Number Effective Dates    [address] [address] [phone]  1/1/2019 - None Entered    [facility] [id number]       Subscriber Name Subscriber Birth Date Member ID       DONOVAN MURGUIA 1950 [id number]                 Emergency Contacts      (Rel.) Home Phone Work Phone Mobile Phone    SUKHJINDER MURGUIA (Spouse) [phone] -- [phone]              "

## 2022-09-22 LAB
ALBUMIN SERPL-MCNC: 3.3 G/DL (ref 3.5–5.2)
ALBUMIN/GLOB SERPL: 1.1 G/DL
ALP SERPL-CCNC: 76 U/L (ref 39–117)
ALT SERPL W P-5'-P-CCNC: 22 U/L (ref 1–41)
ANION GAP SERPL CALCULATED.3IONS-SCNC: 10.3 MMOL/L (ref 5–15)
AST SERPL-CCNC: 40 U/L (ref 1–40)
BASOPHILS # BLD AUTO: 0.03 10*3/MM3 (ref 0–0.2)
BASOPHILS NFR BLD AUTO: 0.6 % (ref 0–1.5)
BILIRUB SERPL-MCNC: 0.4 MG/DL (ref 0–1.2)
BUN SERPL-MCNC: 10 MG/DL (ref 8–23)
BUN/CREAT SERPL: 17.9 (ref 7–25)
CALCIUM SPEC-SCNC: 8.8 MG/DL (ref 8.6–10.5)
CHLORIDE SERPL-SCNC: 101 MMOL/L (ref 98–107)
CO2 SERPL-SCNC: 25.7 MMOL/L (ref 22–29)
CREAT SERPL-MCNC: 0.56 MG/DL (ref 0.76–1.27)
DEPRECATED RDW RBC AUTO: 38.5 FL (ref 37–54)
EGFRCR SERPLBLD CKD-EPI 2021: 104.1 ML/MIN/1.73
EOSINOPHIL # BLD AUTO: 0.23 10*3/MM3 (ref 0–0.4)
EOSINOPHIL NFR BLD AUTO: 4.3 % (ref 0.3–6.2)
ERYTHROCYTE [DISTWIDTH] IN BLOOD BY AUTOMATED COUNT: 12.5 % (ref 12.3–15.4)
GLOBULIN UR ELPH-MCNC: 2.9 GM/DL
GLUCOSE BLDC GLUCOMTR-MCNC: 133 MG/DL (ref 70–130)
GLUCOSE BLDC GLUCOMTR-MCNC: 143 MG/DL (ref 70–130)
GLUCOSE BLDC GLUCOMTR-MCNC: 173 MG/DL (ref 70–130)
GLUCOSE BLDC GLUCOMTR-MCNC: 192 MG/DL (ref 70–130)
GLUCOSE SERPL-MCNC: 144 MG/DL (ref 65–99)
HBA1C MFR BLD: 6 % (ref 4.8–5.6)
HCT VFR BLD AUTO: 28.4 % (ref 37.5–51)
HGB BLD-MCNC: 9.8 G/DL (ref 13–17.7)
IMM GRANULOCYTES # BLD AUTO: 0.02 10*3/MM3 (ref 0–0.05)
IMM GRANULOCYTES NFR BLD AUTO: 0.4 % (ref 0–0.5)
LYMPHOCYTES # BLD AUTO: 1.27 10*3/MM3 (ref 0.7–3.1)
LYMPHOCYTES NFR BLD AUTO: 23.5 % (ref 19.6–45.3)
MCH RBC QN AUTO: 29.5 PG (ref 26.6–33)
MCHC RBC AUTO-ENTMCNC: 34.5 G/DL (ref 31.5–35.7)
MCV RBC AUTO: 85.5 FL (ref 79–97)
MONOCYTES # BLD AUTO: 0.69 10*3/MM3 (ref 0.1–0.9)
MONOCYTES NFR BLD AUTO: 12.8 % (ref 5–12)
NEUTROPHILS NFR BLD AUTO: 3.16 10*3/MM3 (ref 1.7–7)
NEUTROPHILS NFR BLD AUTO: 58.4 % (ref 42.7–76)
NRBC BLD AUTO-RTO: 0 /100 WBC (ref 0–0.2)
PLATELET # BLD AUTO: 334 10*3/MM3 (ref 140–450)
PMV BLD AUTO: 9.5 FL (ref 6–12)
POTASSIUM SERPL-SCNC: 3.7 MMOL/L (ref 3.5–5.2)
PROT SERPL-MCNC: 6.2 G/DL (ref 6–8.5)
RBC # BLD AUTO: 3.32 10*6/MM3 (ref 4.14–5.8)
SODIUM SERPL-SCNC: 137 MMOL/L (ref 136–145)
TSH SERPL DL<=0.05 MIU/L-ACNC: 2.86 UIU/ML (ref 0.27–4.2)
WBC NRBC COR # BLD: 5.4 10*3/MM3 (ref 3.4–10.8)

## 2022-09-22 PROCEDURE — 97530 THERAPEUTIC ACTIVITIES: CPT

## 2022-09-22 PROCEDURE — G0378 HOSPITAL OBSERVATION PER HR: HCPCS

## 2022-09-22 PROCEDURE — 83036 HEMOGLOBIN GLYCOSYLATED A1C: CPT | Performed by: HOSPITALIST

## 2022-09-22 PROCEDURE — 97165 OT EVAL LOW COMPLEX 30 MIN: CPT

## 2022-09-22 PROCEDURE — 63710000001 INSULIN LISPRO (HUMAN) PER 5 UNITS: Performed by: HOSPITALIST

## 2022-09-22 PROCEDURE — 97110 THERAPEUTIC EXERCISES: CPT

## 2022-09-22 PROCEDURE — 84443 ASSAY THYROID STIM HORMONE: CPT | Performed by: HOSPITALIST

## 2022-09-22 PROCEDURE — 97161 PT EVAL LOW COMPLEX 20 MIN: CPT

## 2022-09-22 PROCEDURE — 82962 GLUCOSE BLOOD TEST: CPT

## 2022-09-22 PROCEDURE — 96361 HYDRATE IV INFUSION ADD-ON: CPT

## 2022-09-22 PROCEDURE — 96366 THER/PROPH/DIAG IV INF ADDON: CPT

## 2022-09-22 PROCEDURE — 85025 COMPLETE CBC W/AUTO DIFF WBC: CPT | Performed by: HOSPITALIST

## 2022-09-22 PROCEDURE — 25010000002 CEFTRIAXONE PER 250 MG: Performed by: HOSPITALIST

## 2022-09-22 PROCEDURE — 80053 COMPREHEN METABOLIC PANEL: CPT | Performed by: HOSPITALIST

## 2022-09-22 RX ADMIN — INSULIN LISPRO 2 UNITS: 100 INJECTION, SOLUTION INTRAVENOUS; SUBCUTANEOUS at 21:20

## 2022-09-22 RX ADMIN — ASPIRIN 81 MG: 81 TABLET, CHEWABLE ORAL at 08:41

## 2022-09-22 RX ADMIN — PANTOPRAZOLE SODIUM 40 MG: 40 TABLET, DELAYED RELEASE ORAL at 06:14

## 2022-09-22 RX ADMIN — AMLODIPINE BESYLATE 5 MG: 5 TABLET ORAL at 08:41

## 2022-09-22 RX ADMIN — TAMSULOSIN HYDROCHLORIDE 0.4 MG: 0.4 CAPSULE ORAL at 21:09

## 2022-09-22 RX ADMIN — SODIUM CHLORIDE 75 ML/HR: 9 INJECTION, SOLUTION INTRAVENOUS at 03:19

## 2022-09-22 RX ADMIN — CEFTRIAXONE SODIUM 1 G: 1 INJECTION, POWDER, FOR SOLUTION INTRAMUSCULAR; INTRAVENOUS at 21:09

## 2022-09-22 RX ADMIN — TAMSULOSIN HYDROCHLORIDE 0.4 MG: 0.4 CAPSULE ORAL at 08:41

## 2022-09-22 NOTE — PROGRESS NOTES
Ortho Post -op Visit (2weeks)    Patient was seen and examined today.  He is 2 weeks out from a left total knee replacement.  He had some urinary retention postop ended up having an indwelling catheter and it appears as though he has urinary tract infection which is treated.  I was notified that he was in the hospital.    On exam his knee is very benign in appearance he has good range of motion from roughly 0 to 110 degrees he has good active quads there is no significant swelling his calf is soft and nontender with a negative Homans' sign.  His noram dressing is intact there is no drainage there is no erythema of the surrounding skin.    I have given him postop instructions for the 2-week period for his total knee.  He already has outpatient therapy set up.  While in hospital I recommend that he ice the knee half an hour few times a day.  He should keep the surgical dressing in place while in the hospital.  After he is discharged from the hospital he may remove the surgical dressing and shower as needed.  I have recommended that he follow-up with me in 2 weeks.  Please call [phone] for appointment.    Electronically signed by Heriberto Rodríguez MD, 09/22/22, 8:35 AM EDT.

## 2022-09-22 NOTE — THERAPY DISCHARGE NOTE
Acute Care - Occupational Therapy Discharge  [facility]    Patient Name: Everton Murguia  : 1950    MRN: [id number]                              Today's Date: 2022       Admit Date: 2022    Visit Dx:     ICD-10-CM ICD-9-CM   1. Acute UTI  N39.0 599.0   2. Bacteremia  R78.81 790.7   3. Urinary retention  R33.9 788.20   4. Hx of total knee arthroplasty, left  Z96.652 V43.65     Patient Active Problem List   Diagnosis   • Primary osteoarthritis of right knee   • Primary osteoarthritis of left knee   • Acute UTI     Past Medical History:   Diagnosis Date   • Arthritis    • Hypertension    • Knee pain, bilateral    • Polio    • Type 2 diabetes mellitus, with long-term current use of insulin (HCC)      Past Surgical History:   Procedure Laterality Date   • TOTAL KNEE ARTHROPLASTY Left 2022    Procedure: TOTAL KNEE ARTHROPLASTY WITH CORI ROBOT;  Surgeon: Heriberto Rodríguez MD;  Location: [facility] OR [facility];  Service: Orthopedics;  Laterality: Left;      General Information     Row Name 22 1402          OT Time and Intention    Document Type evaluation;therapy note (daily note)  -LE     Mode of Treatment occupational therapy;individual therapy  -     Row Name 22 140          General Information    Patient Profile Reviewed yes  -LE     Prior Level of Function independent:;min assist:;ADL's  wife has been assisting with lower body ADL since TKR 2 weeks ago.  -LE     Existing Precautions/Restrictions fall  -     Row Name 22 140          Living Environment    People in Home spouse  -     Row Name 22 140          Cognition    Orientation Status (Cognition) oriented x 4  -     Row Name 22 140          Safety Issues, Functional Mobility    Comment, Safety Issues/Impairments (Mobility) non skid socks and gait belt  -LE           User Key  (r) = Recorded By, (t) = Taken By, (c) = Cosigned By    Initials Name Provider Type    Mary Alice Garcia OTR Occupational Therapist                Mobility/ADL's     Row Name 09/22/22 1403          Bed Mobility    Comment, (Bed Mobility) in BR when enter and left in chair.  -     Row Name 09/22/22 1403          Transfers    Transfers sit-stand transfer;stand-sit transfer;toilet transfer  -LE     Sit-Stand [place] (Transfers) standby assist;modified independence  -LE     Stand-Sit [place] (Transfers) standby assist;modified independence  -     [place] Level (Toilet Transfer) modified independence  -LE     Row Name 09/22/22 1403          Bed-Chair Transfer    Comment, (Bed-Chair Transfer) commode to chair.  pt was in bathroom first attempt, OT returns when pt puts on call light from BR and when enter pt is standing in bathroom at walker.  -     Row Name 09/22/22 1403          Sit-Stand Transfer    Assistive Device (Sit-Stand Transfers) walker, front-wheeled  -     Row Name 09/22/22 1403          Stand-Sit Transfer    Assistive Device (Stand-Sit Transfers) walker, front-wheeled  -     Row Name 09/22/22 1403          Toilet Transfer    Type (Toilet Transfer) stand pivot/stand step  -     Row Name 09/22/22 1403          Functional Mobility    Functional Mobility- Ind. Level standby assist  -     Functional Mobility- Device walker, front-wheeled  -     Functional Mobility-Distance (Feet) --  bathroom to sink to chair.  -     Row Name 09/22/22 1403          Activities of Daily Living    BADL Assessment/Intervention toileting;lower body dressing;grooming  -     Row Name 09/22/22 1403          Toileting Assessment/Training    [place] Level (Toileting) toileting skills;modified independence  -     Row Name 09/22/22 1403          Lower Body Dressing Assessment/Training    [place] Level (Lower Body Dressing) doff;don;socks;minimum assist (75% patient effort)  -EDWARD     Comment, (Lower Body Dressing) doffs both socks, donns R sock.  A L sock to avoid twisting at knee. wife has been assisting since TKR  -     Row Name  09/22/22 1403          Grooming Assessment/Training    [place] Level (Grooming) wash face, hands;standby assist  -LE     Position (Grooming) sink side;supported standing  -LE           User Key  (r) = Recorded By, (t) = Taken By, (c) = Cosigned By    Initials Name Provider Type    Mary Alice Garcia OTR Occupational Therapist               Obj/Interventions     Row Name 09/22/22 1406          Range of Motion Comprehensive    General Range of Motion bilateral upper extremity ROM WFL  -LE           User Key  (r) = Recorded By, (t) = Taken By, (c) = Cosigned By    Initials Name Provider Type    Mary Alice Garcia OTR Occupational Therapist               Goals/Plan     Row Name 09/22/22 1408          Problem Specific Goal 1 (OT)    Problem Specific Goal 1 (OT) Pt to ambulate in room and complete toileting without assist.  -LE     Time Frame (Problem Specific Goal 1, OT) 1 day  -LE     Progress/Outcome (Problem Specific Goal 1, OT) goal met  -LE           User Key  (r) = Recorded By, (t) = Taken By, (c) = Cosigned By    Initials Name Provider Type    Mary Alice Garcia OTR Occupational Therapist               Clinical Impression     Row Name 09/22/22 1406          Pain Assessment    Pretreatment Pain Rating 0/10 - no pain  -LE     Row Name 09/22/22 1406          Plan of Care Review    Plan of Care Reviewed With patient  -LE     Outcome Evaluation Pt admit from home with inability to urinate.  Pt had L TKR 9/7/22 and has been at home with intemittent help with lower body ADL by wife. Pt is performing at baseline function, is SBA/mod I for bathroom mobility and grooming.  No further skilled acute care OT needs at this time.  Ed cont getting up to BR with nsg.  -LE     Row Name 09/22/22 1406          Therapy Assessment/Plan (OT)    Therapy Frequency (OT) evaluation only  -LE     Row Name 09/22/22 1406          Therapy Plan Review/Discharge Plan (OT)    Equipment Needs Upon Discharge (OT) --  walker.  -LE     Anticipated  Discharge Disposition (OT) home with assist  -     Row Name 09/22/22 1406          Vital Signs    O2 Delivery Pre Treatment room air  -LE     Pre Patient Position Standing  -LE     Intra Patient Position Standing  -LE     Post Patient Position Sitting  -LE     Row Name 09/22/22 1406          Positioning and Restraints    Pre-Treatment Position standing in room  -LE     Post Treatment Position chair  -LE     In Chair reclined;call light within reach;encouraged to call for assist;exit alarm on  -LE           User Key  (r) = Recorded By, (t) = Taken By, (c) = Cosigned By    Initials Name Provider Type    Mary Alice Garcia OTR Occupational Therapist               Outcome Measures     Row Name 09/22/22 1409          How much help from another is currently needed...    Putting on and taking off regular lower body clothing? 3  -LE     Bathing (including washing, rinsing, and drying) 3  -LE     Toileting (which includes using toilet bed pan or urinal) 4  -LE     Putting on and taking off regular upper body clothing 3  -LE     Taking care of personal grooming (such as brushing teeth) 4  -LE     Eating meals 4  -LE     AM-PAC 6 Clicks Score (OT) 21  -LE     Row Name 09/22/22 1200          How much help from another person do you currently need...    Turning from your back to your side while in flat bed without using bedrails? 3  -LH     Moving from lying on back to sitting on the side of a flat bed without bedrails? 3  -LH     Moving to and from a bed to a chair (including a wheelchair)? 3  -LH     Standing up from a chair using your arms (e.g., wheelchair, bedside chair)? 3  -LH     Climbing 3-5 steps with a railing? 3  -LH     To walk in hospital room? 3  -LH     AM-PAC 6 Clicks Score (PT) 18  -LH     Highest level of mobility 6 --> Walked 10 steps or more  -     Row Name 09/22/22 1409 09/22/22 1200       Functional Assessment    Outcome Measure Options AM-PAC 6 Clicks Daily Activity (OT)  -LE AM-PAC 6 Clicks Basic  Mobility (PT)  -          User Key  (r) = Recorded By, (t) = Taken By, (c) = Cosigned By    Initials Name Provider Type    LE Mary Alice Padilla OTR Occupational Therapist     Mary Alice Rees PT Physical Therapist              Occupational Therapy Education                 Title: PT OT SLP Therapies (Done)     Topic: Occupational Therapy (Done)     Point: ADL training (Done)     Description:   Instruct learner(s) on proper safety adaptation and remediation techniques during self care or transfers.   Instruct in proper use of assistive devices.              Learning Progress Summary           Patient Acceptance, E, Bed IU by EDWARD at 9/22/2022 1409    Comment: role of OT                               User Key     Initials Effective Dates Name Provider Type Discipline     06/16/21 -  Mary Alice Padilla OTR Occupational Therapist OT              OT Recommendation and Plan  Therapy Frequency (OT): evaluation only  Plan of Care Review  Plan of Care Reviewed With: patient  Outcome Evaluation: Pt admit from home with inability to urinate.  Pt had L TKR 9/7/22 and has been at home with intemittent help with lower body ADL by wife. Pt is performing at baseline function, is SBA/mod I for bathroom mobility and grooming.  No further skilled acute care OT needs at this time.  Ed cont getting up to BR with nsg.  Plan of Care Reviewed With: patient  Outcome Evaluation: Pt admit from home with inability to urinate.  Pt had L TKR 9/7/22 and has been at home with intemittent help with lower body ADL by wife. Pt is performing at baseline function, is SBA/mod I for bathroom mobility and grooming.  No further skilled acute care OT needs at this time.  Ed cont getting up to BR with nsg.     Time Calculation:    Time Calculation- OT     Row Name 09/22/22 1410             Time Calculation- OT    OT Start Time 1342  -LE      OT Stop Time 1353  -LE      OT Time Calculation (min) 11 min  -LE      Total Timed Code Minutes- OT 11 minute(s)  -LE      OT  Received On 09/22/22  -LE              Untimed Charges    OT Eval/Re-eval Minutes 11  -LE              Total Minutes    Untimed Charges Total Minutes 11  -LE       Total Minutes 11  -LE            User Key  (r) = Recorded By, (t) = Taken By, (c) = Cosigned By    Initials Name Provider Type    Mary Alice Garcia OTR Occupational Therapist              Therapy Charges for Today     Code Description Service Date Service Provider Modifiers Qty    89216637210 HC OT EVAL LOW COMPLEXITY 2 9/22/2022 Mary Alice Padilla OTR GO 1             OT Discharge Summary  Anticipated Discharge Disposition (OT): home with assist  Reason for Discharge: At baseline function  Discharge Destination: Home with assist    BAYLEE Franks  9/22/2022

## 2022-09-22 NOTE — THERAPY EVALUATION
Acute Care - Physical Therapy Initial Evaluation  [facility]     Patient Name: Everton Murguia  : 1950  MRN: [id number]  Today's Date: 2022   Onset of Illness/Injury or Date of Surgery: 22  Visit Dx:     ICD-10-CM ICD-9-CM   1. Acute UTI  N39.0 599.0   2. Bacteremia  R78.81 790.7   3. Urinary retention  R33.9 788.20   4. Hx of total knee arthroplasty, left  Z96.652 V43.65     Patient Active Problem List   Diagnosis   • Primary osteoarthritis of right knee   • Primary osteoarthritis of left knee   • Acute UTI     Past Medical History:   Diagnosis Date   • Arthritis    • Hypertension    • Knee pain, bilateral    • Polio    • Type 2 diabetes mellitus, with long-term current use of insulin ([facility])      Past Surgical History:   Procedure Laterality Date   • TOTAL KNEE ARTHROPLASTY Left 2022    Procedure: TOTAL KNEE ARTHROPLASTY WITH CORI ROBOT;  Surgeon: Heriberto Rodríguez MD;  Location: [facility] OR [facility];  Service: Orthopedics;  Laterality: Left;     PT Assessment (last 12 hours)     PT Evaluation and Treatment     Row Name 22 1200          Physical Therapy Time and Intention    Subjective Information no complaints  -     Document Type evaluation  -     Mode of Treatment individual therapy;physical therapy  -     Patient Effort excellent  -     Symptoms Noted During/After Treatment none  -     Row Name 22 1200          General Information    Patient Profile Reviewed yes  -     Onset of Illness/Injury or Date of Surgery 22  -     Patient Observations alert;cooperative;agree to therapy  -     General Observations of Patient Pt supine in bed no acute distress, spouse bedside  -     Prior Level of Function independent:  -     Equipment Currently Used at Home walker, rolling  -     Pertinent History of Current Functional Problem UTI, urinary retention, recent L TKR  -     Existing Precautions/Restrictions fall  -     Risks Reviewed patient and family:  -      Benefits Reviewed patient and family:  -[facility] Name 09/22/22 1200          Living Environment    Current Living Arrangements home  -[facility] Name 09/22/22 1200          Home Use of Assistive/Adaptive Equipment    Equipment Currently Used at Home walker, rolling  -[facility] Name 09/22/22 1200          Pain    Pretreatment Pain Rating 0/10 - no pain  -     Posttreatment Pain Rating 0/10 - no pain  -[facility] Name 09/22/22 1200          Cognition    Affect/Mental Status (Cognition) WNL  -     Orientation Status (Cognition) oriented x 4  -[facility] Name 09/22/22 1200          Range of Motion Comprehensive    Comment, General Range of Motion LEs WFL  -[facility] Name 09/22/22 1200          Strength Comprehensive (MMT)    Comment, General Manual Muscle Testing (MMT) Assessment BLEs grossly at least 3/5  -[facility] Name 09/22/22 1200          Bed Mobility    Bed Mobility supine-sit;sit-supine  -     Supine-Sit [place] (Bed Mobility) standby assist  -     Sit-Supine [place] (Bed Mobility) not tested  -     Assistive Device (Bed Mobility) bed rails  -     Comment, (Bed Mobility) inc time to complete task  -LH     Row Name 09/22/22 1200          Transfers    Transfers stand-sit transfer;sit-stand transfer  -     Sit-Stand [place] (Transfers) standby assist  -     Stand-Sit [place] (Transfers) standby assist  -LH     Row Name 09/22/22 1200          Sit-Stand Transfer    Assistive Device (Sit-Stand Transfers) walker, front-wheeled  -[facility] Name 09/22/22 1200          Stand-Sit Transfer    Assistive Device (Stand-Sit Transfers) walker, front-wheeled  -[facility] Name 09/22/22 1200          Gait/Stairs (Locomotion)    [place] Level (Gait) contact guard  -     Assistive Device (Gait) walker, front-wheeled  -     Distance in Feet (Gait) 200  -     Pattern (Gait) step-through  -[facility] Name 09/22/22 1200          Motor Skills    Therapeutic Exercise --  TKR protocol 10  reps  -     Row Name             Wound 09/07/22 1006 Left anterior knee Incision    Wound - Properties Group Placement Date: 09/07/22  -MB Placement Time: 1006  -MB Side: Left  -MB Orientation: anterior  -MB Location: knee  -MB Primary Wound Type: Incision  -MB     Retired Wound - Properties Group Placement Date: 09/07/22  -MB Placement Time: 1006  -MB Side: Left  -MB Orientation: anterior  -MB Location: knee  -MB Primary Wound Type: Incision  -MB     Retired Wound - Properties Group Date first assessed: 09/07/22  -MB Time first assessed: 1006  -MB Side: Left  -MB Location: knee  -MB Primary Wound Type: Incision  -MB     Row Name 09/22/22 1200          Plan of Care Review    Plan of Care Reviewed With patient  -     Outcome Evaluation Pt 73 yo male admitted w UTI, urinary retention, L TKR 9/7/22. Pt ambulated 200ft SBA rwx, completed TKR therex 10 reps. Pt plans to DC home. Encouraged continued mobilization w nsg staff. Pt may benefit from skilled PT acutely for generalized strengthening.  -     Row Name 09/22/22 1200          Positioning and Restraints    Pre-Treatment Position in bed  -     Post Treatment Position chair  -     In Chair reclined;notified nsg;call light within reach;encouraged to call for assist;exit alarm on;with family/caregiver  -[facility] Name 09/22/22 1200          Therapy Assessment/Plan (PT)    Rehab Potential (PT) good, to achieve stated therapy goals  -     Criteria for Skilled Interventions Met (PT) yes  -     Therapy Frequency (PT) 6 times/wk  -     Row Name 09/22/22 1200          PT Evaluation Complexity    History, PT Evaluation Complexity 1-2 personal factors and/or comorbidities  -     Examination of Body Systems (PT Eval Complexity) total of 3 or more elements  -     Clinical Presentation (PT Evaluation Complexity) stable  -     Clinical Decision Making (PT Evaluation Complexity) low complexity  -     Overall Complexity (PT Evaluation Complexity) low  complexity  -[facility] Name 09/22/22 1200          Physical Therapy Goals    Bed Mobility Goal Selection (PT) bed mobility, PT goal 1  -     Transfer Goal Selection (PT) transfer, PT goal 1  -     Gait Training Goal Selection (PT) gait training, PT goal 1  -[facility] Name 09/22/22 1200          Bed Mobility Goal 1 (PT)    Activity/Assistive Device (Bed Mobility Goal 1, PT) bed mobility activities, all  -     [place] Level/Cues Needed (Bed Mobility Goal 1, PT) modified independence  -LH     Time Frame (Bed Mobility Goal 1, PT) 1 week  -[facility] Name 09/22/22 1200          Transfer Goal 1 (PT)    Activity/Assistive Device (Transfer Goal 1, PT) sit-to-stand/stand-to-sit;bed-to-chair/chair-to-bed;walker, rolling  -     [place] Level/Cues Needed (Transfer Goal 1, PT) modified independence  -LH     Time Frame (Transfer Goal 1, PT) 1 week  -[facility] Name 09/22/22 1200          Gait Training Goal 1 (PT)    Activity/Assistive Device (Gait Training Goal 1, PT) assistive device use;walker, rolling  -     [place] Level (Gait Training Goal 1, PT) modified independence  -     Distance (Gait Training Goal 1, PT) 300  -LH     Time Frame (Gait Training Goal 1, PT) 1 week  -           User Key  (r) = Recorded By, (t) = Taken By, (c) = Cosigned By    Initials Name Provider Type     Mary Alice Rees, PT Physical Therapist    Mildred Man RN Registered Nurse                Physical Therapy Education                 Title: PT OT SLP Therapies (Done)     Topic: Physical Therapy (Done)     Point: Mobility training (Done)     Learning Progress Summary           Patient Acceptance, E, NR,VU,DU by  at 9/22/2022 1226                   Point: Home exercise program (Done)     Learning Progress Summary           Patient Acceptance, E, NR,VU,DU by  at 9/22/2022 1226                   Point: Body mechanics (Done)     Learning Progress Summary           Patient Acceptance, E, NR,VU,DU by  at 9/22/2022  1226                   Point: Precautions (Done)     Learning Progress Summary           Patient Acceptance, E, NR,VU,DU by  at 9/22/2022 1226                               User Key     Initials Effective Dates Name Provider Type Discipline     06/16/21 -  Mary Alice Rees, PT Physical Therapist PT              PT Recommendation and Plan  Anticipated Discharge Disposition (PT): home with assist, home with outpatient therapy services  Planned Therapy Interventions (PT): balance training, bed mobility training, gait training, home exercise program, stair training, ROM (range of motion), strengthening, stretching, transfer training  Therapy Frequency (PT): 6 times/wk  Plan of Care Reviewed With: patient  Outcome Evaluation: Pt 71 yo male admitted w UTI, urinary retention, L TKR 9/7/22. Pt ambulated 200ft SBA rwx, completed TKR therex 10 reps. Pt plans to DC home. Encouraged continued mobilization w [facility] staff. Pt may benefit from skilled PT acutely for generalized strengthening.   Outcome Measures     Row Name 09/22/22 1200             How much help from another person do you currently need...    Turning from your back to your side while in flat bed without using bedrails? 3  -LH      Moving from lying on back to sitting on the side of a flat bed without bedrails? 3  -LH      Moving to and from a bed to a chair (including a wheelchair)? 3  -LH      Standing up from a chair using your arms (e.g., wheelchair, bedside chair)? 3  -LH      Climbing 3-5 steps with a railing? 3  -LH      To walk in hospital room? 3  -      AM-PAC 6 Clicks Score (PT) 18  -              Functional Assessment    Outcome Measure Options AM-PAC 6 Clicks Basic Mobility (PT)  -            User Key  (r) = Recorded By, (t) = Taken By, (c) = Cosigned By    Initials Name Provider Type     Mary Alice Rees, PT Physical Therapist                 Time Calculation:    PT Charges     Row Name 09/22/22 1227             Time Calculation    Start Time 1040   -      Stop Time 1115  -      Time Calculation (min) 35 min  -      PT Received On 09/22/22  -      PT - Next Appointment 09/23/22  -      PT Goal Re-Cert Due Date 09/29/22  -              Time Calculation- PT    Total Timed Code Minutes- PT 25 minute(s)  -            User Key  (r) = Recorded By, (t) = Taken By, (c) = Cosigned By    Initials Name Provider Type     Mary Alice Rees, PT Physical Therapist              Therapy Charges for Today     Code Description Service Date Service Provider Modifiers Qty    14472808777 HC PT EVAL LOW COMPLEXITY 2 9/22/2022 Mary Alice Rees, PT GP 1    91521789302 HC PT THER PROC EA 15 MIN 9/22/2022 Mary Alice Rees, PT GP 1    52144014776 HC PT THERAPEUTIC ACT EA 15 MIN 9/22/2022 Mary Alice Rees, PT GP 1          PT G-Codes  Outcome Measure Options: AM-PAC 6 Clicks Basic Mobility (PT)  AM-PAC 6 Clicks Score (PT): 18    Mary Alice Rees, PT  9/22/2022

## 2022-09-22 NOTE — PLAN OF CARE
Goal Outcome Evaluation:  Plan of Care Reviewed With: patient        Progress: improving  Outcome Evaluation: Patient had no c/o pain through the night. Hathaway remains in place. Education provided on importance of cleaning the catheter to prevent furture infections. IVF continued. ACHS. SR-SB. Overall VSS. IVF continued. IV Rocephin as scheduled. Possible discharge today or tomorrow with hathaway. Will continue to monitor.

## 2022-09-22 NOTE — PROGRESS NOTES
"Daily progress note    Primary care physician      Chief complaint  Doing better with no new complaints.  Patient family at bedside.    History of present illness  72 years old white male with history of hypertension diabetes mellitus osteoarthritis who recently underwent left total knee arthroplasty and have urinary retention followed by urology and treated with Martinez catheter which was discontinued yesterday presented to [facility] emergency room with unable to void with lower abdominal discomfort and low-grade fever.  Patient work-up in ER revealed acute UTI with urine retention admit for management.  Patient denies any chest pain shortness of breath abdominal pain nausea vomiting diarrhea.     REVIEW OF SYSTEMS  Constitutional: Positive for fever. Negative for chills.   HENT: Negative.    Eyes: Negative.    Respiratory: Negative for cough and shortness of breath.    Cardiovascular: Negative for chest pain and palpitations.   Gastrointestinal: Negative for abdominal pain.   Genitourinary: Positive for difficulty urinating.   Musculoskeletal: Negative for neck pain.   Skin: Negative.    Neurological: Negative.    Psychiatric/Behavioral: Negative.       PHYSICAL EXAM   Blood pressure 137/82, pulse 65, temperature 98.6 °F (37 °C), temperature source Oral, resp. rate 16, height 185.4 cm (73\"), weight 92.9 kg (204 lb 14.4 oz), SpO2 95 %.    GENERAL: Well-nourished, nontoxic it does appear slightly uncomfortable  HEENT:  Unremarkable  NECK:  Supple  CV: regular rhythm, regular rate  RESPIRATORY  normal effort moving air bilaterally  ABDOMEN:  Soft nontender nondistended bowel sounds positive  MUSCULOSKELETAL: no deformity  NEURO: alert, moves all extremities, follows commands  SKIN:  Warm     LAB RESULTS  Lab Results (last 24 hours)     Procedure Component Value Units Date/Time    POC Glucose Once [[id number]]  (Abnormal) Collected: 09/22/22 1117    Specimen: Blood Updated: 09/22/22 1120     Glucose " 173 mg/dL      Comment: Meter: [id number] : [id number] Dominique LINDSAY       TSH [[id number]]  (Normal) Collected: 09/22/22 0735    Specimen: Blood Updated: 09/22/22 0916     TSH 2.860 uIU/mL     Comprehensive Metabolic Panel [[id number]]  (Abnormal) Collected: 09/22/22 0735    Specimen: Blood Updated: 09/22/22 0910     Glucose 144 mg/dL      BUN 10 mg/dL      Creatinine 0.56 mg/dL      Sodium 137 mmol/L      Potassium 3.7 mmol/L      Chloride 101 mmol/L      CO2 25.7 mmol/L      Calcium 8.8 mg/dL      Total Protein 6.2 g/dL      Albumin 3.30 g/dL      ALT (SGPT) 22 U/L      AST (SGOT) 40 U/L      Alkaline Phosphatase 76 U/L      Total Bilirubin 0.4 mg/dL      Globulin 2.9 gm/dL      A/G Ratio 1.1 g/dL      BUN/Creatinine Ratio 17.9     Anion Gap 10.3 mmol/L      eGFR 104.1 mL/min/1.73      Comment: National Kidney Foundation and American Society of Nephrology (ASN) Task Force recommended calculation based on the Chronic Kidney Disease Epidemiology Collaboration (CKD-EPI) equation refit without adjustment for race.       Narrative:      GFR Normal >60  Chronic Kidney Disease <60  Kidney Failure <15      Hemoglobin A1c [[id number]]  (Abnormal) Collected: 09/22/22 0735    Specimen: Blood Updated: 09/22/22 0902     Hemoglobin A1C 6.00 %     Narrative:      Hemoglobin A1C Ranges:    Increased Risk for Diabetes  5.7% to 6.4%  Diabetes                     >= 6.5%  Diabetic Goal                < 7.0%    CBC & Differential [[id number]]  (Abnormal) Collected: 09/22/22 0735    Specimen: Blood Updated: 09/22/22 0853    Narrative:      The following orders were created for panel order CBC & Differential.  Procedure                               Abnormality         Status                     ---------                               -----------         ------                     CBC Auto Differential[[id number]]        Abnormal            Final result                 Please view results for these tests on the individual orders.     CBC Auto Differential [[id number]]  (Abnormal) Collected: 09/22/22 0735    Specimen: Blood Updated: 09/22/22 0853     WBC 5.40 10*3/mm3      RBC 3.32 10*6/mm3      Hemoglobin 9.8 g/dL      Hematocrit 28.4 %      MCV 85.5 fL      MCH 29.5 pg      MCHC 34.5 g/dL      RDW 12.5 %      RDW-SD 38.5 fl      MPV 9.5 fL      Platelets 334 10*3/mm3      Neutrophil % 58.4 %      Lymphocyte % 23.5 %      Monocyte % 12.8 %      Eosinophil % 4.3 %      Basophil % 0.6 %      Immature Grans % 0.4 %      Neutrophils, Absolute 3.16 10*3/mm3      Lymphocytes, Absolute 1.27 10*3/mm3      Monocytes, Absolute 0.69 10*3/mm3      Eosinophils, Absolute 0.23 10*3/mm3      Basophils, Absolute 0.03 10*3/mm3      Immature Grans, Absolute 0.02 10*3/mm3      nRBC 0.0 /100 WBC     POC Glucose Once [[id number]]  (Abnormal) Collected: 09/22/22 0625    Specimen: Blood Updated: 09/22/22 0632     Glucose 133 mg/dL      Comment: Meter: [id number] : [id number] [employer] NA       Blood Culture - Blood, Arm, Right [[id number]]  (Normal) Collected: 09/20/22 2300    Specimen: Blood from Arm, Right Updated: 09/21/22 2332     Blood Culture No growth at 24 hours    Blood Culture - Blood, Arm, Left [[id number]]  (Normal) Collected: 09/20/22 2149    Specimen: Blood from Arm, Left Updated: 09/21/22 2217     Blood Culture No growth at 24 hours    POC Glucose Once [[id number]]  (Abnormal) Collected: 09/21/22 2035    Specimen: Blood Updated: 09/21/22 2036     Glucose 159 mg/dL      Comment: Meter: [id number] : [id number] [employer]MAYKORda NA       POC Glucose Once [[id number]]  (Abnormal) Collected: 09/21/22 1611    Specimen: Blood Updated: 09/21/22 1612     Glucose 172 mg/dL      Comment: Meter: [id number] : [id number] Dominique LINDSAY           Imaging Results (Last 24 Hours)     ** No results found for the last 24 hours. **          Current Facility-Administered Medications:   •  amLODIPine (NORVASC) tablet 5 mg, 5 mg, Oral, Q24H, Damon Johnson MD, 5 mg  at 09/22/22 0841  •  aspirin chewable tablet 81 mg, 81 mg, Oral, Daily, Ginger Johnson MD, 81 mg at 09/22/22 0841  •  cefTRIAXone (ROCEPHIN) 1 g in sodium chloride 0.9 % 100 mL IVPB-VTB, 1 g, Intravenous, Q24H, Ginger Johnson MD, Stopped at 09/21/22 2225  •  insulin lispro (ADMELOG) injection 0-7 Units, 0-7 Units, Subcutaneous, 4x Daily With Meals & Nightly, Ginger Johnson MD, 2 Units at 09/21/22 1844  •  nitroglycerin (NITROSTAT) SL tablet 0.4 mg, 0.4 mg, Sublingual, Q5 Min PRN, Ginger Johnson MD  •  pantoprazole (PROTONIX) EC tablet 40 mg, 40 mg, Oral, Q AM, Ginger oJhnson MD, 40 mg at 09/22/22 0614  •  [COMPLETED] Insert peripheral IV, , , Once **AND** sodium chloride 0.9 % flush 10 mL, 10 mL, Intravenous, PRN, Delroy Mendoza III, PA  •  sodium chloride 0.9 % infusion, 75 mL/hr, Intravenous, Continuous, Ginger Johnson MD, Last Rate: 75 mL/hr at 09/22/22 1231, 75 mL/hr at 09/22/22 1231  •  tamsulosin (FLOMAX) 24 hr capsule 0.4 mg, 0.4 mg, Oral, BID, Joseph Espinoza MD, 0.4 mg at 09/22/22 0841    Facility-Administered Medications Ordered in Other Encounters:   •  Chlorhexidine Gluconate Cloth 2 % pads, , Apply externally, BID, Heriberto Rodríguez MD    ASSESSMENT  Acute UTI  Urinary retention  Recent left total knee arthroplasty  Diabetes mellitus  Hypertension  Osteoarthritis  Gastroesophageal reflux disease    PLAN  CPM appreciated  IVF  IV antibiotics  Martinez catheter  Urology consult appreciated  Orthopedic surgery to follow patient  Adjust home medications  Stress ulcer DVT prophylaxis   Supportive care  PT/OT  Discussed with nursing staff  Follow closely further recommendation current hospital course    GINGER JOHNSON MD

## 2022-09-22 NOTE — PLAN OF CARE
Goal Outcome Evaluation:  Plan of Care Reviewed With: patient           Outcome Evaluation: Pt 73 yo male admitted w UTI, urinary retention, L TKR 9/7/22. Pt ambulated 200ft SBA rwx, completed TKR therex 10 reps. Pt plans to DC home. Encouraged continued mobilization w nsg staff. Pt may benefit from skilled PT acutely for generalized strengthening.      .Patient was wearing a face mask during this therapy encounter. Therapist used appropriate personal protective equipment including eye protection, mask, and gloves.  Mask used was standard procedure mask. Appropriate PPE was worn during the entire therapy session. Hand hygiene was completed before and after therapy session. Patient is not in enhanced droplet precautions.

## 2022-09-22 NOTE — PLAN OF CARE
Goal Outcome Evaluation:  Plan of Care Reviewed With: patient           Outcome Evaluation: Pt admit from home with inability to urinate.  Pt had L TKR 9/7/22 and has been at home with intemittent help with lower body ADL by wife. Pt is performing at baseline function, is SBA/mod I for bathroom mobility and grooming.  No further skilled acute care OT needs at this time.  Ed cont getting up to BR with nsg.

## 2022-09-23 ENCOUNTER — HOME CARE VISIT (OUTPATIENT)
Dept: HOME HEALTH SERVICES | Facility: HOME HEALTHCARE | Age: 72
End: 2022-09-23

## 2022-09-23 VITALS
DIASTOLIC BLOOD PRESSURE: 85 MMHG | HEART RATE: 56 BPM | WEIGHT: 204.9 LBS | BODY MASS INDEX: 27.16 KG/M2 | OXYGEN SATURATION: 95 % | RESPIRATION RATE: 18 BRPM | TEMPERATURE: 97.7 F | SYSTOLIC BLOOD PRESSURE: 134 MMHG | HEIGHT: 73 IN

## 2022-09-23 LAB
ANION GAP SERPL CALCULATED.3IONS-SCNC: 9 MMOL/L (ref 5–15)
BUN SERPL-MCNC: 9 MG/DL (ref 8–23)
BUN/CREAT SERPL: 13.4 (ref 7–25)
CALCIUM SPEC-SCNC: 8.5 MG/DL (ref 8.6–10.5)
CHLORIDE SERPL-SCNC: 103 MMOL/L (ref 98–107)
CO2 SERPL-SCNC: 28 MMOL/L (ref 22–29)
CREAT SERPL-MCNC: 0.67 MG/DL (ref 0.76–1.27)
DEPRECATED RDW RBC AUTO: 41 FL (ref 37–54)
EGFRCR SERPLBLD CKD-EPI 2021: 98.6 ML/MIN/1.73
EOSINOPHIL # BLD MANUAL: 0.19 10*3/MM3 (ref 0–0.4)
EOSINOPHIL NFR BLD MANUAL: 3 % (ref 0.3–6.2)
ERYTHROCYTE [DISTWIDTH] IN BLOOD BY AUTOMATED COUNT: 12.7 % (ref 12.3–15.4)
GLUCOSE BLDC GLUCOMTR-MCNC: 127 MG/DL (ref 70–130)
GLUCOSE BLDC GLUCOMTR-MCNC: 217 MG/DL (ref 70–130)
GLUCOSE SERPL-MCNC: 131 MG/DL (ref 65–99)
HCT VFR BLD AUTO: 30.5 % (ref 37.5–51)
HGB BLD-MCNC: 10 G/DL (ref 13–17.7)
LYMPHOCYTES # BLD MANUAL: 1.42 10*3/MM3 (ref 0.7–3.1)
LYMPHOCYTES NFR BLD MANUAL: 9 % (ref 5–12)
MCH RBC QN AUTO: 29.2 PG (ref 26.6–33)
MCHC RBC AUTO-ENTMCNC: 32.8 G/DL (ref 31.5–35.7)
MCV RBC AUTO: 88.9 FL (ref 79–97)
MONOCYTES # BLD: 0.58 10*3/MM3 (ref 0.1–0.9)
NEUTROPHILS # BLD AUTO: 4.26 10*3/MM3 (ref 1.7–7)
NEUTROPHILS NFR BLD MANUAL: 66 % (ref 42.7–76)
NRBC BLD AUTO-RTO: 0 /100 WBC (ref 0–0.2)
PLAT MORPH BLD: NORMAL
PLATELET # BLD AUTO: 346 10*3/MM3 (ref 140–450)
PMV BLD AUTO: 9.3 FL (ref 6–12)
POTASSIUM SERPL-SCNC: 4 MMOL/L (ref 3.5–5.2)
RBC # BLD AUTO: 3.43 10*6/MM3 (ref 4.14–5.8)
RBC MORPH BLD: NORMAL
SODIUM SERPL-SCNC: 140 MMOL/L (ref 136–145)
VARIANT LYMPHS NFR BLD MANUAL: 22 % (ref 19.6–45.3)
WBC MORPH BLD: NORMAL
WBC NRBC COR # BLD: 6.45 10*3/MM3 (ref 3.4–10.8)

## 2022-09-23 PROCEDURE — 63710000001 INSULIN LISPRO (HUMAN) PER 5 UNITS: Performed by: HOSPITALIST

## 2022-09-23 PROCEDURE — 85007 BL SMEAR W/DIFF WBC COUNT: CPT | Performed by: HOSPITALIST

## 2022-09-23 PROCEDURE — 80048 BASIC METABOLIC PNL TOTAL CA: CPT | Performed by: HOSPITALIST

## 2022-09-23 PROCEDURE — G0378 HOSPITAL OBSERVATION PER HR: HCPCS

## 2022-09-23 PROCEDURE — 82962 GLUCOSE BLOOD TEST: CPT

## 2022-09-23 PROCEDURE — 85025 COMPLETE CBC W/AUTO DIFF WBC: CPT | Performed by: HOSPITALIST

## 2022-09-23 RX ORDER — CEFDINIR 300 MG/1
300 CAPSULE ORAL EVERY 12 HOURS SCHEDULED
Qty: 10 CAPSULE | Refills: 0 | Status: SHIPPED | OUTPATIENT
Start: 2022-09-23 | End: 2022-09-28

## 2022-09-23 RX ORDER — PANTOPRAZOLE SODIUM 40 MG/1
40 TABLET, DELAYED RELEASE ORAL DAILY
Qty: 14 TABLET | Refills: 0 | Status: SHIPPED | OUTPATIENT
Start: 2022-09-23 | End: 2022-10-07

## 2022-09-23 RX ORDER — CEFDINIR 300 MG/1
300 CAPSULE ORAL EVERY 12 HOURS SCHEDULED
Status: DISCONTINUED | OUTPATIENT
Start: 2022-09-23 | End: 2022-09-23 | Stop reason: HOSPADM

## 2022-09-23 RX ADMIN — CEFDINIR 300 MG: 300 CAPSULE ORAL at 13:35

## 2022-09-23 RX ADMIN — PANTOPRAZOLE SODIUM 40 MG: 40 TABLET, DELAYED RELEASE ORAL at 06:34

## 2022-09-23 RX ADMIN — ASPIRIN 81 MG: 81 TABLET, CHEWABLE ORAL at 10:19

## 2022-09-23 RX ADMIN — INSULIN LISPRO 3 UNITS: 100 INJECTION, SOLUTION INTRAVENOUS; SUBCUTANEOUS at 13:14

## 2022-09-23 RX ADMIN — TAMSULOSIN HYDROCHLORIDE 0.4 MG: 0.4 CAPSULE ORAL at 10:19

## 2022-09-23 RX ADMIN — AMLODIPINE BESYLATE 5 MG: 5 TABLET ORAL at 10:19

## 2022-09-23 NOTE — PROGRESS NOTES
[facility] will be D/Cing services due to patient to begin outpatient PT this Monday, 26th per joint protocol.

## 2022-09-23 NOTE — PROGRESS NOTES
"Daily progress note    Primary care physician      Chief complaint  Doing better with no new complaints and wants to go home    History of present illness  72 years old white male with history of hypertension diabetes mellitus osteoarthritis who recently underwent left total knee arthroplasty and have urinary retention followed by urology and treated with Martinez catheter which was discontinued yesterday presented to [facility] emergency room with unable to void with lower abdominal discomfort and low-grade fever.  Patient work-up in ER revealed acute UTI with urine retention admit for management.  Patient denies any chest pain shortness of breath abdominal pain nausea vomiting diarrhea.     REVIEW OF SYSTEMS  Unremarkable except generalized weakness    PHYSICAL EXAM   Blood pressure 158/84, pulse 55, temperature 98.3 °F (36.8 °C), temperature source Oral, resp. rate 18, height 185.4 cm (73\"), weight 92.9 kg (204 lb 14.4 oz), SpO2 92 %.    GENERAL: Well-nourished, nontoxic it does appear slightly uncomfortable  HEENT:  Unremarkable  NECK:  Supple  CV: regular rhythm, regular rate  RESPIRATORY  normal effort moving air bilaterally  ABDOMEN:  Soft nontender nondistended bowel sounds positive  MUSCULOSKELETAL: no deformity  NEURO: alert, moves all extremities, follows commands  SKIN:  Warm     LAB RESULTS  Lab Results (last 24 hours)     Procedure Component Value Units Date/Time    POC Glucose Once [[id number]]  (Abnormal) Collected: 09/23/22 1118    Specimen: Blood Updated: 09/23/22 1121     Glucose 217 mg/dL      Comment: RN Notified R and V Meter: [id number] : [id number] Dominique LINDSAY       Manual Differential [[id number]]  (Normal) Collected: 09/23/22 0620    Specimen: Blood Updated: 09/23/22 0827     Neutrophil % 66.0 %      Lymphocyte % 22.0 %      Monocyte % 9.0 %      Eosinophil % 3.0 %      Neutrophils Absolute 4.26 10*3/mm3      Lymphocytes Absolute 1.42 10*3/mm3      Monocytes " Absolute 0.58 10*3/mm3      Eosinophils Absolute 0.19 10*3/mm3      RBC Morphology Normal     WBC Morphology Normal     Platelet Morphology Normal    Basic Metabolic Panel [[id number]]  (Abnormal) Collected: 09/23/22 0620    Specimen: Blood Updated: 09/23/22 0734     Glucose 131 mg/dL      BUN 9 mg/dL      Creatinine 0.67 mg/dL      Sodium 140 mmol/L      Potassium 4.0 mmol/L      Chloride 103 mmol/L      CO2 28.0 mmol/L      Calcium 8.5 mg/dL      BUN/Creatinine Ratio 13.4     Anion Gap 9.0 mmol/L      eGFR 98.6 mL/min/1.73      Comment: National Kidney Foundation and American Society of Nephrology (ASN) Task Force recommended calculation based on the Chronic Kidney Disease Epidemiology Collaboration (CKD-EPI) equation refit without adjustment for race.       Narrative:      GFR Normal >60  Chronic Kidney Disease <60  Kidney Failure <15      CBC & Differential [[id number]]  (Abnormal) Collected: 09/23/22 0620    Specimen: Blood Updated: 09/23/22 0723    Narrative:      The following orders were created for panel order CBC & Differential.  Procedure                               Abnormality         Status                     ---------                               -----------         ------                     CBC Auto Differential[[id number]]        Abnormal            Final result                 Please view results for these tests on the individual orders.    CBC Auto Differential [[id number]]  (Abnormal) Collected: 09/23/22 0620    Specimen: Blood Updated: 09/23/22 0723     WBC 6.45 10*3/mm3      RBC 3.43 10*6/mm3      Hemoglobin 10.0 g/dL      Hematocrit 30.5 %      MCV 88.9 fL      MCH 29.2 pg      MCHC 32.8 g/dL      RDW 12.7 %      RDW-SD 41.0 fl      MPV 9.3 fL      Platelets 346 10*3/mm3      nRBC 0.0 /100 WBC     POC Glucose Once [[id number]]  (Normal) Collected: 09/23/22 0606    Specimen: Blood Updated: 09/23/22 0608     Glucose 127 mg/dL      Comment: Meter: [id number] : [id number] Oziel LINDSAY        Blood Culture - Blood, Arm, Right [[id number]]  (Normal) Collected: 09/20/22 2300    Specimen: Blood from Arm, Right Updated: 09/22/22 2330     Blood Culture No growth at 2 days    Blood Culture - Blood, Arm, Left [[id number]]  (Normal) Collected: 09/20/22 2149    Specimen: Blood from Arm, Left Updated: 09/22/22 2215     Blood Culture No growth at 2 days    POC Glucose Once [[id number]]  (Abnormal) Collected: 09/22/22 2117    Specimen: Blood Updated: 09/22/22 2119     Glucose 192 mg/dL      Comment: Meter: [id number] : [id number] Edu Lopez RN       POC Glucose Once [[id number]]  (Abnormal) Collected: 09/22/22 1629    Specimen: Blood Updated: 09/22/22 1643     Glucose 143 mg/dL      Comment: Meter: [id number] : [id number] Dominique LINDSAY           Imaging Results (Last 24 Hours)     ** No results found for the last 24 hours. **          Current Facility-Administered Medications:   •  amLODIPine (NORVASC) tablet 5 mg, 5 mg, Oral, Q24H, Damon Johnson MD, 5 mg at 09/23/22 1019  •  aspirin chewable tablet 81 mg, 81 mg, Oral, Daily, Damon Johnson MD, 81 mg at 09/23/22 1019  •  cefTRIAXone (ROCEPHIN) 1 g in sodium chloride 0.9 % 100 mL IVPB-VTB, 1 g, Intravenous, Q24H, Damon Johnson MD, Stopped at 09/22/22 2150  •  insulin lispro (ADMELOG) injection 0-7 Units, 0-7 Units, Subcutaneous, 4x Daily With Meals & Nightly, Damon Johnson MD, 2 Units at 09/22/22 2120  •  pantoprazole (PROTONIX) EC tablet 40 mg, 40 mg, Oral, Q AM, Damon Johnson MD, 40 mg at 09/23/22 0634  •  [COMPLETED] Insert peripheral IV, , , Once **AND** sodium chloride 0.9 % flush 10 mL, 10 mL, Intravenous, PRN, Delroy Mendoza III, PA  •  tamsulosin (FLOMAX) 24 hr capsule 0.4 mg, 0.4 mg, Oral, BID, Joseph Espinoza MD, 0.4 mg at 09/23/22 1019    Facility-Administered Medications Ordered in Other Encounters:   •  Chlorhexidine Gluconate Cloth 2 % pads, , Apply externally, BID, Heriberto Rodríguez MD    ASSESSMENT  Acute UTI  Urinary retention  with Martinez catheter   Recent left total knee arthroplasty  Diabetes mellitus  Hypertension  Osteoarthritis  Gastroesophageal reflux disease    PLAN  Discharge home  Discharge summary dictated    GINGER QUIROGA MD

## 2022-09-23 NOTE — CASE MANAGEMENT/SOCIAL WORK
Continued Stay Note  [facility]     Patient Name: Everton Murguia  MRN: [id number]  Today's Date: 9/23/2022    Admit Date: 9/20/2022    Plan: return home with spouse and plans to begin outpatient PT on Monday   Discharge Plan     Row Name 09/23/22 1244       Plan    Plan return home with spouse and plans to begin outpatient PT on Monday    Patient/Family in Agreement with Plan yes    Row Name 09/23/22 1242       Plan    Plan Comments Kiah/KATERYNA spoke with family yesterday and they will be signing off so that the patient can begin outpatient PT on Monday.  Nurse aware that patient will need a leg bag @ dc. Myriam Andres RN               Discharge Codes    No documentation.               Expected Discharge Date and Time     Expected Discharge Date Expected Discharge Time    Sep 23, 2022             Myriam Andres RN

## 2022-09-23 NOTE — PLAN OF CARE
Goal Outcome Evaluation:  Plan of Care Reviewed With: patient        Progress: improving  Outcome Evaluation: Patient had no complaints through the night. Up with assist. Martinez remains in place, patient is cleaning it himself after educating yesterday evening importance of keeping clean. IV Rocephin as scheduled. VSS. Posssible discharge today. Will continue to monitor.

## 2022-09-23 NOTE — CASE COMMUNICATION
Patient missed a PT visit from [facility] on 9/23/2022    Reason: currently in the hospital       For your records only.   As per home health protocol, MD must be notified of missed/cancelled visits; therefore the prescribed frequency was not met.

## 2022-09-23 NOTE — DISCHARGE SUMMARY
Discharge summary    Date of admission 9/20/2022  Date of discharge 9/23/2022    Final diagnosis   Acute UTI resolving  Urinary retention with Martinez catheter   Recent left total knee arthroplasty  Diabetes mellitus  Hypertension  Osteoarthritis  Gastroesophageal reflux disease    Discharge medications    Current Facility-Administered Medications:   •  amLODIPine (NORVASC) tablet 5 mg, 5 mg, Oral, Q24H, Damon Johnson MD, 5 mg at 09/23/22 1019  •  aspirin chewable tablet 81 mg, 81 mg, Oral, Daily, Damon Johnson MD, 81 mg at 09/23/22 1019  •  cefdinir (OMNICEF) capsule 300 mg, 300 mg, Oral, Q12H, Damon Johnson MD  •  insulin lispro (ADMELOG) injection 0-7 Units, 0-7 Units, Subcutaneous, 4x Daily With Meals & Nightly, Damon Johnson MD, 2 Units at 09/22/22 2120  •  pantoprazole (PROTONIX) EC tablet 40 mg, 40 mg, Oral, Q AM, Damon Johnson MD, 40 mg at 09/23/22 0634  •  [COMPLETED] Insert peripheral IV, , , Once **AND** sodium chloride 0.9 % flush 10 mL, 10 mL, Intravenous, PRN, Delroy Mendoza III, PA  •  tamsulosin (FLOMAX) 24 hr capsule 0.4 mg, 0.4 mg, Oral, BID, Joseph Espinoza MD, 0.4 mg at 09/23/22 1019    Facility-Administered Medications Ordered in Other Encounters:   •  Chlorhexidine Gluconate Cloth 2 % pads, , Apply externally, BID, Heriberto Rodríguez MD     Consults obtained  Urology    Procedures  None    Hospital course  72 white male with history of hypertension diabetes osteoarthritis who recently underwent left total knee arthroplasty followed by urinary retention treated with Martinez catheter which was discontinued day before admission admit to emergency room with unable to empty bladder.  Patient work-up in ER revealed acute UTI and urinary retention admit for management.  Patient treated with empiric IV antibiotics and his cultures remains negative and his Martinez catheter replaced by urology and they recommend to discharge on Martinez catheter.  Patient is feeling better will be discharged on oral  antibiotics for 5 more days with outpatient follow-up with urology.    Discharge diet regular    Activity as tolerated    Medication as above    Follow-up with [facility] doctor in 1 week and follow-up with urology per their instruction and take medication as directed.    GINGER QUIROGA MD

## 2022-09-23 NOTE — PLAN OF CARE
Goal Outcome Evaluation:  Plan of Care Reviewed With: patient        Progress: improving  Outcome Evaluation: Provided Catheter care instruction and education on leg bag use. Appetite good. No complaints of pain. Pt to keep dressing on left knee until home and ready to shower. Will follow-up with Ortho and Urology outpatient. Discharge instruction and education sheets provided. Discharged to home with spouse.

## 2022-09-23 NOTE — HOME HEALTH
[place] DC without a visit due to pt under observation at hospital secondary to UTI/catheter issues.  He plans to DC home from hospital today but start OP PT on Monday.  Therefore, PT DC [facility] without a visit.

## 2022-09-25 LAB
BACTERIA SPEC AEROBE CULT: NORMAL
BACTERIA SPEC AEROBE CULT: NORMAL

## 2022-09-26 ENCOUNTER — TELEPHONE (OUTPATIENT)
Dept: ORTHOPEDIC SURGERY | Facility: CLINIC | Age: 72
End: 2022-09-26

## 2022-09-26 NOTE — TELEPHONE ENCOUNTER
Spoke with patient and advised him to leave the glue strip intact. I also made him a follow up appointment for 9/29 to get staples out.

## 2022-09-26 NOTE — TELEPHONE ENCOUNTER
Caller: DONOVAN MCKINNEY    Relationship to patient: SELF    Best call back number: [phone]    Patient is needing: REQ TO SPEAK WITH SOMEOME REGARDING HIS SURGICAL BANDAGES AND REMOVAL

## 2022-09-26 NOTE — CASE MANAGEMENT/SOCIAL WORK
Case Management Discharge Note      Final Note: DC'd home 9/23    Provided Post Acute Provider List?: N/A  N/A Provider List Comment: Is current with [facility]          Selected Continued Care - Prior Encounters Includes selections from prior encounters from 6/22/2022 to 9/23/2022    Discharged on 9/8/2022 Admission date: 9/7/2022 - Discharge disposition: Home-Health Care Svc    Home Medical Care     Service Provider Selected Services Address Phone Fax Patient Preferred    [facility]  [facility] [address] [address] [phone] --                    Transportation Services  Private: Car    Final Discharge Disposition Code: 01 - home or self-care

## 2022-09-27 ENCOUNTER — TREATMENT (OUTPATIENT)
Dept: PHYSICAL THERAPY | Facility: CLINIC | Age: 72
End: 2022-09-27

## 2022-09-27 DIAGNOSIS — M25.562 ACUTE POSTOPERATIVE PAIN OF LEFT KNEE: ICD-10-CM

## 2022-09-27 DIAGNOSIS — G89.18 ACUTE POSTOPERATIVE PAIN OF LEFT KNEE: ICD-10-CM

## 2022-09-27 DIAGNOSIS — R26.2 DIFFICULTY WALKING: ICD-10-CM

## 2022-09-27 DIAGNOSIS — M25.662 STIFFNESS OF LEFT KNEE: ICD-10-CM

## 2022-09-27 DIAGNOSIS — Z96.652 STATUS POST TOTAL LEFT KNEE REPLACEMENT: Primary | ICD-10-CM

## 2022-09-27 PROCEDURE — 97530 THERAPEUTIC ACTIVITIES: CPT | Performed by: PHYSICAL THERAPIST

## 2022-09-27 PROCEDURE — 97110 THERAPEUTIC EXERCISES: CPT | Performed by: PHYSICAL THERAPIST

## 2022-09-27 PROCEDURE — 97161 PT EVAL LOW COMPLEX 20 MIN: CPT | Performed by: PHYSICAL THERAPIST

## 2022-09-27 NOTE — PROGRESS NOTES
Physical Therapy Initial Evaluation and Plan of Care    Patient: Everton Murguia   : 1950  Diagnosis/ICD-10 Code:  Status post total left knee replacement [Z96.652]  Referring practitioner: KENDRA Luevano    Subjective Evaluation    History of Present Illness  Date of surgery: 2022  Mechanism of injury: S/p robotic-assisted (L) TKA by Dr. Rodríguez on 2022. He did have a subsequent inpatient admission for acute UTI and urinary retention which interfered with his post-operative PT.  His catheter was removed today. He has been continuing his home health HEP and walking on his street with his RWx the best he can.  He lives at home with his wife where he has basement steps and 2 steps to enter his kitchen.  He currently uses RWx but did not use an AD prior to surgery.  He reports he did not sleep well at all last night. His (R) knee has end stage knee OA as well but he does not have much pain in it.    PMH notable for OA, HTN, DM 2, and hx of polio.      Patient Occupation: Retired; does some [employer] work on the side Quality of life: good    Pain  Current pain ratin  At best pain ratin  At worst pain ratin  Location: (L) quadriceps mm, (B) medial and lateral aspects of knee  Quality: dull ache  Relieving factors: ice (applies ice 2-3 times daily, Aleve)  Aggravating factors: stairs, movement, standing, ambulation, squatting, prolonged positioning and sleeping  Progression: improved    Social Support  Lives in: multiple-level home  Lives with: spouse    Hand dominance: right    Diagnostic Tests  No diagnostic tests performed    Patient Goals  Patient goals for therapy: decreased pain, increased motion, increased strength and independence with ADLs/IADLs  Patient goal: walk without walker, be able to squat down, go up/down steps normally           Subjective Questionnaire: LEFS: 36/80    Objective          Observations   Left Knee   Positive for incision.     Additional Knee Observation  Details  (L) TKA incision with glue strip intact along its length  Superior and inferior pin sites approximated with staples intact    All incisions appear well-healing without signs of infection    Tenderness     Additional Tenderness Details  Mod (+) TTP (L) medial tibial plateau, medial femoral epicondyle    Active Range of Motion   Left Knee   Flexion: 72 degrees with pain  Extension: 6 degrees   Extensor la degrees     Right Knee   Flexion: 122 degrees   Extension: 0 degrees   Extensor la degrees     Strength/Myotome Testing     Left Hip   Planes of Motion   Flexion: 4-  Abduction: 3+  Adduction: 3+    Right Hip   Planes of Motion   Flexion: 4+  Abduction: 4-  Adduction: 4-    Left Knee   Quadriceps contraction: fair    Right Knee   Flexion: 4+  Extension: 5  Quadriceps contraction: good    Left Ankle/Foot   Dorsiflexion: 4-    Right Ankle/Foot   Dorsiflexion: 4+    Additional Strength Details  (L) ankle exhibits lack of dorsiflexion strength and a tendency toward pronated and inverted resting position  (L) knee MMT deferred due to recent post-op status    Swelling     Left Knee Girth Measurement (cm)   Joint line: 45.2.    Right Knee Girth Measurement (cm)   Joint line: 41.0.    Ambulation     Observational Gait   Gait: antalgic   Decreased walking speed, stride length, left stance time and right step length.   Left foot contact pattern: foot flat  Base of support: increased    Additional Observational Gait Details  Patient ambulates with moderate antalgia, decreased (L) weight shift and stance time, and decreased active (L) knee flexion during swing phase of gait          Assessment & Plan     Assessment  Impairments: abnormal coordination, abnormal gait, abnormal muscle firing, abnormal or restricted ROM, activity intolerance, impaired balance, impaired physical strength, lacks appropriate home exercise program and pain with function  Functional Limitations: sleeping, walking, uncomfortable because  of pain, moving in bed, sitting, standing and stooping  Assessment details: Patient is a 72 y.o male s/p robotic-assisted (L) TKA by Dr. Rodríguez on 09/07/2022.  He did suffer post-operative complication of a UTI/acute urinary retention which required hospital readmission.  Patient reports (L) knee symptoms 5-8/10, worst with prolonged sitting, standing after prolonged sitting, standing, walking, stair negotiation, and sleeping. He exhibits moderate gait compensation, moderate knee joint swelling, decreased and painful (L) knee AROM, and decreased (L) LE strength. His LEFS score is 36/80 indicating a significant perceived level of functional limitation.  He will benefit from skilled PT services to address these deficits, optimize functional activity and mobility tolerance, and improve QOL.  Prognosis: good    Goals  Plan Goals: STGs: to be met in 4 weeks  1. Patient will be independent with initial HEP  2. Patient will report improved (L) knee symptoms 2-4/10 for improved tolerance to ADLs and prolonged positions  3. Patient will demonstrate improved (L) knee AROM 3-110 deg for improved joint mobility  4. Patient will demonstrate >/= 3.0 cm reduction in (L) knee joint line girth as evidence of resolving inflammation  5. Patient will ambulate moderate community distances with STC and minimal compensation    LTGs: to be met in 8 weeks  1. Patient will be independent with progressed HEP  2. Patient will report improved (L) knee symptoms 0-2/10 for improved tolerance to functional activities and mobility  3. Patient will demonstrate improved (L) knee AROM 0-120 deg for improved joint mobility and activity performance  4. Patient will have improved (L) LE strength grossly >/= 4+/5 for improved function  5. Patient will consistently negotiate steps reciprocally and ambulate community distances on level and unlevel terrain without AD  6. Patient will have improved LEFS score >/= 50/80 for subjective evidence of functional  improvement    Plan  Therapy options: will be seen for skilled therapy services  Planned modality interventions: cryotherapy, thermotherapy (hydrocollator packs) and electrical stimulation/Russian stimulation  Planned therapy interventions: ADL retraining, balance/weight-bearing training, fine motor coordination training, flexibility, functional ROM exercises, gait training, home exercise program, joint mobilization, manual therapy, neuromuscular re-education, soft tissue mobilization, strengthening, stretching and therapeutic activities  Frequency: 2x week  Duration in weeks: 8  Treatment plan discussed with: patient        Manual Therapy:         mins  21155;  Therapeutic Exercise:    19     mins  36339;     Neuromuscular Alicia:        mins  63136;    Therapeutic Activity:     13     mins  64920;     Gait Training:           mins  38751;     Ultrasound:          mins  73105;    Electrical Stimulation:         mins  00120 ( );  Dry Needling          mins self-pay    Timed Treatment:   32   mins   Total Treatment:     50   mins    PT SIGNATURE: Monserrat Carroll PT, DPT, OCS  Electronically signed by: Monserrat Carroll PT, 09/27/22, 1:02 PM EDT  [place] License #[id number]     DATE TREATMENT INITIATED: 9/27/2022    Medicare Initial Certification  Certification Period: 9/27/2022 thru 12/25/2022  I certify that the therapy services are furnished while this patient is under my care.  The services outlined above are required by this patient, and will be reviewed every 90 days.     PHYSICIAN: Angel Bal, KENDRA  [phone]                                          DATE:     Please sign and return via fax to [phone]. Thank you, [facility].

## 2022-09-29 ENCOUNTER — OFFICE VISIT (OUTPATIENT)
Dept: ORTHOPEDIC SURGERY | Facility: CLINIC | Age: 72
End: 2022-09-29

## 2022-09-29 VITALS — WEIGHT: 211 LBS | HEIGHT: 73 IN | BODY MASS INDEX: 27.96 KG/M2 | TEMPERATURE: 96.1 F

## 2022-09-29 DIAGNOSIS — Z96.652 S/P TKR (TOTAL KNEE REPLACEMENT), LEFT: Primary | ICD-10-CM

## 2022-09-29 PROCEDURE — 99024 POSTOP FOLLOW-UP VISIT: CPT | Performed by: ORTHOPAEDIC SURGERY

## 2022-09-29 PROCEDURE — 73562 X-RAY EXAM OF KNEE 3: CPT | Performed by: ORTHOPAEDIC SURGERY

## 2022-09-29 NOTE — PROGRESS NOTES
Everton Murguia : 1950 MRN: [id number] DATE: 2022    DIAGNOSIS: 3 week follow up left total knee  - cori    SUBJECTIVE:Patient returns today for 2 week follow up of left total knee replacement. Patient reports doing well with no unusual complaints. Appears to be progressing appropriately.    OBJECTIVE:   Exam:. The incision is healing appropriately. No sign of infection. Range of motion is progressing as expected. The calf is soft and nontender with a negative Homans sign.    X-rays 3 views of the left knee AP lateral sunrise ordered and reviewed for follow-up of left total knee that show well-positioned total knee.  Comparison with the postop films there is no change.  Excellent alignment.    ASSESSMENT: 3week status post left knee replacement.    PLAN: 1) Staples removed and steri strips applied   2) Order given for PT   3) Discontinue JULEE hose   4) Continue ice PRN   5) aspirin 81 mg orally every day for 1 month   6) Follow up in 6 weeks with repeat Xrays of left knee (3views)    Heriberto Rodríguez MD  2022

## 2022-09-30 ENCOUNTER — TREATMENT (OUTPATIENT)
Dept: PHYSICAL THERAPY | Facility: CLINIC | Age: 72
End: 2022-09-30

## 2022-09-30 DIAGNOSIS — G89.18 ACUTE POSTOPERATIVE PAIN OF LEFT KNEE: ICD-10-CM

## 2022-09-30 DIAGNOSIS — M25.662 STIFFNESS OF LEFT KNEE: ICD-10-CM

## 2022-09-30 DIAGNOSIS — R26.2 DIFFICULTY WALKING: ICD-10-CM

## 2022-09-30 DIAGNOSIS — M25.562 ACUTE POSTOPERATIVE PAIN OF LEFT KNEE: ICD-10-CM

## 2022-09-30 DIAGNOSIS — Z96.652 STATUS POST TOTAL LEFT KNEE REPLACEMENT: Primary | ICD-10-CM

## 2022-09-30 PROCEDURE — 97110 THERAPEUTIC EXERCISES: CPT | Performed by: PHYSICAL THERAPIST

## 2022-09-30 NOTE — PROGRESS NOTES
Physical Therapy Daily Treatment Note      Patient: Everton Murguia   : 1950  Referring practitioner: KENDRA Luevano  Date of Initial Visit: Type: THERAPY  Noted: 2022  Today's Date: 2022  Patient seen for 2 sessions         Visit Diagnoses:     ICD-10-CM ICD-9-CM   1. Status post total left knee replacement  Z96.652 V43.65   2. Acute postoperative pain of left knee  G89.18 719.46    M25.562 338.18   3. Stiffness of left knee  M25.662 719.56   4. Difficulty walking  R26.2 719.7         Subjective Evaluation    History of Present Illness    Subjective comment: I saw the doctor yesterday and had the staples removed and (steristrips) placed, but several of them are falling off already.         Objective   See Exercise, Manual, and Modality Logs for complete treatment.   *Initiated SAQ and hip add layla vs ball    Assessment & Plan     Assessment    Assessment details: Patient demonstrates improved quality of gait with RWx including increased active (L) knee flexion during swing phase of gait.  He is encouraged to bring a STC with him to next appointment for gait training with it.  Per visual inspection patient demonstrates improved (L) knee flexion AAROM with stretching/ROM activities.  Updated HEP printout is issued to facilitate compliance and recall with exercise progressions, and patient responds positively to ice application following exercise.           Progress per Plan of Care         Timed:  Manual Therapy:         mins  63395;  Therapeutic Exercise:    33     mins  37229;     Neuromuscular Alicia:        mins  76081;    Therapeutic Activity:          mins  65151;     Gait Training:           mins  47778;     Ultrasound:          mins  74422;    Untimed:  Electrical Stimulation:         mins  70887 ( );  Mechanical Traction:         mins  33306;   Dry Needling              ___  mins   24192    Timed Treatment:   43   mins   Total Treatment:     43   mins    Monserrat Carroll PT, DPT,  OCS  Physical Therapist  [place] License #[id number]  Electronically signed by: Monserrat Carroll PT, 09/30/22, 9:26 AM EDT

## 2022-10-05 ENCOUNTER — TREATMENT (OUTPATIENT)
Dept: PHYSICAL THERAPY | Facility: CLINIC | Age: 72
End: 2022-10-05

## 2022-10-05 DIAGNOSIS — M25.662 STIFFNESS OF LEFT KNEE: ICD-10-CM

## 2022-10-05 DIAGNOSIS — Z96.652 STATUS POST TOTAL LEFT KNEE REPLACEMENT: Primary | ICD-10-CM

## 2022-10-05 DIAGNOSIS — M25.562 ACUTE POSTOPERATIVE PAIN OF LEFT KNEE: ICD-10-CM

## 2022-10-05 DIAGNOSIS — G89.18 ACUTE POSTOPERATIVE PAIN OF LEFT KNEE: ICD-10-CM

## 2022-10-05 DIAGNOSIS — R26.2 DIFFICULTY WALKING: ICD-10-CM

## 2022-10-05 PROCEDURE — 97110 THERAPEUTIC EXERCISES: CPT | Performed by: PHYSICAL THERAPIST

## 2022-10-05 NOTE — PROGRESS NOTES
Physical Therapy Daily Progress Note      Visit # 3      Subjective Evaluation    History of Present Illness    Subjective comment: Pt reports that he only has pain at end range of knee flexion.  Denies any pain when walking.       Objective   See Exercise, Manual, and Modality Logs for complete treatment.       Assessment & Plan     Assessment    Assessment details: Pt completed treatment with minimal c/o pain.  Pt was able to progress strengthening exercises with no issues.  Added standing knee flexion stretch for improved knee flexion.  Pt declined ice s/p treatment, stating he would ice it at home.                     Manual Therapy:    0     mins  73129;  Therapeutic Exercise:    27     mins  72470;     Neuromuscular Alicia:    0    mins  97081;    Therapeutic Activity:     0     mins  13301;     Gait Trainin     mins  19469;     Ultrasound:     0     mins  96696;    Work Hardening           0      mins 41896  Iontophoresis               0   mins 46659  E-Stim                          _0_ mins 25937 ( )    Timed Treatment:   27   mins   Total Treatment:     27   mins    Ankush Fernandez PTA  Physical Therapist Assistant

## 2022-10-07 ENCOUNTER — TREATMENT (OUTPATIENT)
Dept: PHYSICAL THERAPY | Facility: CLINIC | Age: 72
End: 2022-10-07

## 2022-10-07 DIAGNOSIS — M25.562 ACUTE POSTOPERATIVE PAIN OF LEFT KNEE: ICD-10-CM

## 2022-10-07 DIAGNOSIS — M25.662 STIFFNESS OF LEFT KNEE: ICD-10-CM

## 2022-10-07 DIAGNOSIS — G89.18 ACUTE POSTOPERATIVE PAIN OF LEFT KNEE: ICD-10-CM

## 2022-10-07 DIAGNOSIS — Z96.652 STATUS POST TOTAL LEFT KNEE REPLACEMENT: Primary | ICD-10-CM

## 2022-10-07 DIAGNOSIS — R26.2 DIFFICULTY WALKING: ICD-10-CM

## 2022-10-07 PROCEDURE — 97110 THERAPEUTIC EXERCISES: CPT | Performed by: PHYSICAL THERAPIST

## 2022-10-07 NOTE — PROGRESS NOTES
Physical Therapy Daily Treatment Note      [address], [address], [place] [id number]    Patient: Everton Murguia   : 1950  Referring practitioner: KENDRA Luevano  Date of Initial Visit: Type: THERAPY  Noted: 2022  Today's Date: 10/7/2022  Patient seen for 4 sessions         Visit Diagnoses:     ICD-10-CM ICD-9-CM   1. Status post total left knee replacement  Z96.652 V43.65   2. Acute postoperative pain of left knee  G89.18 719.46    M25.562 338.18   3. Stiffness of left knee  M25.662 719.56   4. Difficulty walking  R26.2 719.7         Subjective Evaluation    History of Present Illness    Subjective comment: I think my knee is doing a little better.  It's stiff in the morning because the night is not easy on it, but after I get moving it loosens up more.  I'm sleeping better than I was. It's been 4 weeks today since surgery.       Objective          Active Range of Motion   Left Knee   Flexion: 97 degrees   Extension: 0 (with foot supported) degrees     Passive Range of Motion   Left Knee   Flexion: 100 degrees       See Exercise, Manual, and Modality Logs for complete treatment.   *Initiated hamstring curls vs band  *Added green band resistance to seated marching    Assessment & Plan     Assessment    Assessment details: Patient's gait is assessed without STC for short distance in PT clinic.  There is minimal (L) LE antalgia evident; however, the greater issue is patient's tendency toward (L) foot position of plantarflexion and inversion with lack of functional dorsiflexion and eversion strength which renders patient at risk of falls due to catching his (L) toes while walking.  Encouraged caution with ambulation within his home without AD but supported persistent use of AD outside of his home at present.  He demonstrates good improvements in (L) knee AROM and AAROM knee flexion.          Progress per Plan of Care         Timed:  Manual Therapy:         mins  53683;  Therapeutic  Exercise:    43     mins  97537;     Neuromuscular Alicia:        mins  74838;    Therapeutic Activity:          mins  72711;     Gait Training:           mins  59909;     Ultrasound:          mins  67476;    Untimed:  Electrical Stimulation:         mins  92757 ( );  Mechanical Traction:         mins  96300;   Dry Needling              ___  mins   85868    Timed Treatment:   43   mins   Total Treatment:     43   mins    Monserrat Carroll PT, DPT, OCS  Physical Therapist  [place] License #[id number]  Electronically signed by: Monserrat Carroll PT, 10/07/22, 10:55 AM EDT

## 2022-10-12 ENCOUNTER — TREATMENT (OUTPATIENT)
Dept: PHYSICAL THERAPY | Facility: CLINIC | Age: 72
End: 2022-10-12

## 2022-10-12 DIAGNOSIS — Z96.652 STATUS POST TOTAL LEFT KNEE REPLACEMENT: Primary | ICD-10-CM

## 2022-10-12 DIAGNOSIS — R26.2 DIFFICULTY WALKING: ICD-10-CM

## 2022-10-12 DIAGNOSIS — M25.662 STIFFNESS OF LEFT KNEE: ICD-10-CM

## 2022-10-12 DIAGNOSIS — M25.562 ACUTE POSTOPERATIVE PAIN OF LEFT KNEE: ICD-10-CM

## 2022-10-12 DIAGNOSIS — G89.18 ACUTE POSTOPERATIVE PAIN OF LEFT KNEE: ICD-10-CM

## 2022-10-12 PROCEDURE — 97110 THERAPEUTIC EXERCISES: CPT | Performed by: PHYSICAL THERAPIST

## 2022-10-12 NOTE — PROGRESS NOTES
Physical Therapy Daily Treatment Note      [address], [address], [place] [id number]    Patient: Everton Murguia   : 1950  Referring practitioner: KENDRA Luevano  Date of Initial Visit: Type: THERAPY  Noted: 2022  Today's Date: 10/12/2022  Patient seen for 5 sessions         Visit Diagnoses:     ICD-10-CM ICD-9-CM   1. Status post total left knee replacement  Z96.652 V43.65   2. Acute postoperative pain of left knee  G89.18 719.46    M25.562 338.18   3. Stiffness of left knee  M25.662 719.56   4. Difficulty walking  R26.2 719.7         Subjective Evaluation    History of Present Illness    Subjective comment: My knee feels pretty okay overall; it just gets sore with the bending exercises.  It's not feeling as stiff as it used to when I first get up in the mornings.       Objective   See Exercise, Manual, and Modality Logs for complete treatment.       Assessment & Plan     Assessment    Assessment details: Patient exhibits persistent moderate stiffness of (L) knee s/p TKA.  He is progressing steadily toward ROM and strength goals, however.  He uses STC for ambulation primarily due to (L) foot inversion and lack of active DF causing patient to ambulate toward lateral border of foot.  Patient is able to progress closed chain strengthening activities with good tolerance this date.           Progress per Plan of Care         Timed:  Manual Therapy:         mins  04392;  Therapeutic Exercise:    43     mins  74631;     Neuromuscular Alicia:        mins  92428;    Therapeutic Activity:          mins  48794;     Gait Training:           mins  97227;     Ultrasound:          mins  48172;    Untimed:  Electrical Stimulation:         mins  68243 ( );  Mechanical Traction:         mins  63358;   Dry Needling              ___  mins   83754    Timed Treatment:   43   mins   Total Treatment:     43   mins    Monserrat Carroll PT, DPT, OCS  Physical Therapist  [place] License #[id number]  Electronically signed  by: Monserrat Carroll, PT, 10/12/22, 10:12 AM EDT

## 2022-10-14 ENCOUNTER — TREATMENT (OUTPATIENT)
Dept: PHYSICAL THERAPY | Facility: CLINIC | Age: 72
End: 2022-10-14

## 2022-10-14 DIAGNOSIS — M25.662 STIFFNESS OF LEFT KNEE: ICD-10-CM

## 2022-10-14 DIAGNOSIS — Z96.652 STATUS POST TOTAL LEFT KNEE REPLACEMENT: Primary | ICD-10-CM

## 2022-10-14 DIAGNOSIS — G89.18 ACUTE POSTOPERATIVE PAIN OF LEFT KNEE: ICD-10-CM

## 2022-10-14 DIAGNOSIS — R26.2 DIFFICULTY WALKING: ICD-10-CM

## 2022-10-14 DIAGNOSIS — M25.562 ACUTE POSTOPERATIVE PAIN OF LEFT KNEE: ICD-10-CM

## 2022-10-14 PROCEDURE — 97110 THERAPEUTIC EXERCISES: CPT | Performed by: PHYSICAL THERAPIST

## 2022-10-14 PROCEDURE — 97530 THERAPEUTIC ACTIVITIES: CPT | Performed by: PHYSICAL THERAPIST

## 2022-10-14 NOTE — PROGRESS NOTES
Physical Therapy Daily Treatment Note      [address], [address], [place] [id number]    Patient: Everton Murguia   : 1950  Referring practitioner: KENDRA Luevano  Date of Initial Visit: Type: THERAPY  Noted: 2022  Today's Date: 10/14/2022  Patient seen for 6 sessions         Visit Diagnoses:     ICD-10-CM ICD-9-CM   1. Status post total left knee replacement  Z96.652 V43.65   2. Acute postoperative pain of left knee  G89.18 719.46    M25.562 338.18   3. Stiffness of left knee  M25.662 719.56   4. Difficulty walking  R26.2 719.7         Subjective Evaluation    History of Present Illness    Subjective comment: I think my knee is doing a little better and maybe bending a little more.  My knee hurt a lot after therapy the other day and bothered me at night; I had to take a pain pill.       Objective          Active Range of Motion   Left Knee   Flexion: 101 degrees   Extension: 2 degrees   Extensor la degrees     Passive Range of Motion   Left Knee   Flexion: 105 degrees       See Exercise, Manual, and Modality Logs for complete treatment.   *Increased NuStep resistance to lvl 5    Assessment & Plan     Assessment    Assessment details: Patient utilizes [facility] for community ambulation with minimal gait compensation.  He demonstrates improved (L) knee AROM and PROM flexion and extension this date.  He does lack a few degrees of terminal knee extension which renders his knee slightly less stable during stance phase of gait.  Encouraged patient to push ROM activities as much as he can tolerate to further progress toward PT ROM goals for the (L) knee post-operatively.          Progress per Plan of Care with persistent emphasis on regaining optimal knee ROM.         Timed:  Manual Therapy:         mins  83403;  Therapeutic Exercise:    36     mins  35078;     Neuromuscular Alicia:        mins  04770;    Therapeutic Activity:     8     mins  40988;     Gait Training:           mins  75038;      Ultrasound:          mins  55867;    Untimed:  Electrical Stimulation:         mins  88718 ( );  Mechanical Traction:         mins  15641;   Dry Needling              ___  mins   20561    Timed Treatment:   44   mins   Total Treatment:     44   mins    Monserrat Carroll PT, DPT, [facility]  Physical Therapist  [place] License #[id number]  Electronically signed by: Monserrat Carroll PT, 10/14/22, 10:30 AM EDT

## 2022-10-19 ENCOUNTER — TREATMENT (OUTPATIENT)
Dept: PHYSICAL THERAPY | Facility: CLINIC | Age: 72
End: 2022-10-19

## 2022-10-19 DIAGNOSIS — R26.9 GAIT DISTURBANCE: ICD-10-CM

## 2022-10-19 DIAGNOSIS — Z96.652 STATUS POST TOTAL LEFT KNEE REPLACEMENT: Primary | ICD-10-CM

## 2022-10-19 DIAGNOSIS — M25.662 STIFFNESS OF LEFT KNEE: ICD-10-CM

## 2022-10-19 DIAGNOSIS — R26.2 DIFFICULTY WALKING: ICD-10-CM

## 2022-10-19 DIAGNOSIS — G89.18 ACUTE POSTOPERATIVE PAIN OF LEFT KNEE: ICD-10-CM

## 2022-10-19 DIAGNOSIS — M25.562 ACUTE POSTOPERATIVE PAIN OF LEFT KNEE: ICD-10-CM

## 2022-10-19 DIAGNOSIS — Z74.09 IMPAIRED FUNCTIONAL MOBILITY AND ACTIVITY TOLERANCE: ICD-10-CM

## 2022-10-19 PROCEDURE — 97110 THERAPEUTIC EXERCISES: CPT | Performed by: PHYSICAL THERAPIST

## 2022-10-19 NOTE — PROGRESS NOTES
"Physical Therapy Daily Progress Note      Visit # 7      Subjective Evaluation    History of Present Illness    Subjective comment: Pt reprts that his knee is \"pretty good\" today.       Objective   See Exercise, Manual, and Modality Logs for complete treatment.       Assessment & Plan     Assessment    Assessment details: Pt tolerated treatment with no c/o pain in (L) knee.  Pt was able to progress all strengthening exercise with no issues.  Continue to progress per POC.                     Manual Therapy:    0     mins  15002;  Therapeutic Exercise:    36     mins  63726;     Neuromuscular Alicia:    0    mins  28532;    Therapeutic Activity:     0     mins  40243;     Gait Trainin     mins  01219;     Ultrasound:     0     mins  03848;    Work Hardening           0      mins 35675  Iontophoresis               0   mins 60392  E-Stim                          _0_ mins 89793 ( )    Timed Treatment:   36   mins   Total Treatment:     36   mins    Ankush Fernandez PTA  Physical Therapist Assistant  "

## 2022-10-21 ENCOUNTER — TREATMENT (OUTPATIENT)
Dept: PHYSICAL THERAPY | Facility: CLINIC | Age: 72
End: 2022-10-21

## 2022-10-21 DIAGNOSIS — M25.662 STIFFNESS OF LEFT KNEE: ICD-10-CM

## 2022-10-21 DIAGNOSIS — Z74.09 IMPAIRED FUNCTIONAL MOBILITY AND ACTIVITY TOLERANCE: ICD-10-CM

## 2022-10-21 DIAGNOSIS — M25.562 CHRONIC PAIN OF LEFT KNEE: ICD-10-CM

## 2022-10-21 DIAGNOSIS — R26.2 DIFFICULTY WALKING: ICD-10-CM

## 2022-10-21 DIAGNOSIS — R26.9 GAIT DISTURBANCE: ICD-10-CM

## 2022-10-21 DIAGNOSIS — G89.29 CHRONIC PAIN OF LEFT KNEE: ICD-10-CM

## 2022-10-21 DIAGNOSIS — M25.562 ACUTE POSTOPERATIVE PAIN OF LEFT KNEE: ICD-10-CM

## 2022-10-21 DIAGNOSIS — G89.18 ACUTE POSTOPERATIVE PAIN OF LEFT KNEE: ICD-10-CM

## 2022-10-21 DIAGNOSIS — Z96.652 STATUS POST TOTAL LEFT KNEE REPLACEMENT: Primary | ICD-10-CM

## 2022-10-21 DIAGNOSIS — M17.12 PRIMARY OSTEOARTHRITIS OF LEFT KNEE: ICD-10-CM

## 2022-10-21 PROCEDURE — 97110 THERAPEUTIC EXERCISES: CPT | Performed by: PHYSICAL THERAPIST

## 2022-10-21 PROCEDURE — 97530 THERAPEUTIC ACTIVITIES: CPT | Performed by: PHYSICAL THERAPIST

## 2022-10-21 NOTE — PROGRESS NOTES
"Physical Therapy Daily Progress Note      Visit # 8      Subjective Evaluation    History of Present Illness    Subjective comment: Pt reports that his (L) knee is feeling \"a whole lot better\".       Objective   See Exercise, Manual, and Modality Logs for complete treatment.   Added supine knee extension stretch, and sit to stands  Assessment & Plan     Assessment    Assessment details: Pt completed treatment with no c/o pain. Pt tolerated the addition of supine knee extension stretch to address deficits in knee extension well.  Added sit to stands for increased functional strength gains.  Continue to progress as tolerated                     Manual Therapy:    0     mins  51298;  Therapeutic Exercise:    36     mins  54082;     Neuromuscular Alicia:    0    mins  29240;    Therapeutic Activity:     15     mins  97900;     Gait Trainin     mins  66516;     Ultrasound:     0     mins  68978;    Work Hardening           0      mins 26906  Iontophoresis               0   mins 10923  E-Stim                          _0_ mins 58754 ( )    Timed Treatment:   51   mins   Total Treatment:     51   mins    Ankush Fernandez PTA  Physical Therapist Assistant  "

## 2022-10-26 ENCOUNTER — TREATMENT (OUTPATIENT)
Dept: PHYSICAL THERAPY | Facility: CLINIC | Age: 72
End: 2022-10-26

## 2022-10-26 DIAGNOSIS — R26.9 GAIT DISTURBANCE: ICD-10-CM

## 2022-10-26 DIAGNOSIS — Z96.652 STATUS POST TOTAL LEFT KNEE REPLACEMENT: Primary | ICD-10-CM

## 2022-10-26 DIAGNOSIS — G89.18 ACUTE POSTOPERATIVE PAIN OF LEFT KNEE: ICD-10-CM

## 2022-10-26 DIAGNOSIS — M25.562 ACUTE POSTOPERATIVE PAIN OF LEFT KNEE: ICD-10-CM

## 2022-10-26 DIAGNOSIS — M25.662 STIFFNESS OF LEFT KNEE: ICD-10-CM

## 2022-10-26 PROCEDURE — 97530 THERAPEUTIC ACTIVITIES: CPT | Performed by: PHYSICAL THERAPIST

## 2022-10-26 PROCEDURE — 97110 THERAPEUTIC EXERCISES: CPT | Performed by: PHYSICAL THERAPIST

## 2022-10-26 NOTE — PROGRESS NOTES
Re-Assessment / Re-Certification  [address] [facility], [address], [place] [address]    Patient: Everton Murguia   : 1950  Diagnosis/ICD-10 Code:  Status post total left knee replacement [Z96.652]  Referring practitioner: KENDRA Luevano  Date of Initial Visit: 10/26/2022  Today's Date: 10/26/2022  Patient seen for 9 sessions      Subjective:   Clinical Progress: improved  Home Program Compliance: Yes  Treatment has included: therapeutic exercise, neuromuscular re-education, therapeutic activity and cryotherapy    Subjective Evaluation    History of Present Illness    Subjective comment: My knee is doing better.  I did have a really bad night last night for some reason.  But I am walking better, not on the outside of my foot as much, and I am not using my cane at all anymore.  I can go up and down my steps at home but only one foot at a time (non-reciprocal). My knee still feels stiff and tight like there is still some swelling in it.Pain  Current pain ratin  At worst pain ratin  Location: (L) knee         Objective          Active Range of Motion   Left Knee   Flexion: 105 degrees   Extension: 2 degrees   Extensor la degrees     Passive Range of Motion   Left Knee   Flexion: 110 degrees     Strength/Myotome Testing     Left Knee   Flexion: 4+ (within available ROM)  Extension: 4+ (within available ROM)  Quadriceps contraction: fair      Assessment & Plan     Assessment    Assessment details: Patient has been motivated and compliant with PT interventions.  He reports steady improvements in (L) knee symptoms and functional ability.  He demonstrates improved (L) knee AROM 2-105 deg and AAROM/PROM knee flexion of 110 deg.  His strength is fair to good within limited ROM arcs.  He has transitioned to ambulation without assistive device and non-reciprocal stair negotiation within his home.  He has met 2 short term goals, partially met 2 short term goals, and the remainder of short and long term  goals remain ongoing.  He will benefit to continue skilled PT services to optimize functional recovery s/p (L) TKA.    Goals  Plan Goals: STGs: to be met in 3 weeks  1. Patient will be independent with initial HEP - MET  2. Patient will report improved (L) knee symptoms 2-4/10 for improved tolerance to ADLs and prolonged positions - PARTIALLY MET  3. Patient will demonstrate improved (L) knee AROM 3-110 deg for improved joint mobility - PARTIALLY MET  4. Patient will demonstrate >/= 3.0 cm reduction in (L) knee joint line girth as evidence of resolving inflammation - NOT ASSESSED  5. Patient will ambulate moderate community distances with STC and minimal compensation - MET    LTGs: to be met in 6 weeks  1. Patient will be independent with progressed HEP - NOT MET  2. Patient will report improved (L) knee symptoms 0-2/10 for improved tolerance to functional activities and mobility - NOT MET  3. Patient will demonstrate improved (L) knee AROM 0-120 deg for improved joint mobility and activity performance - NOT MET  4. Patient will have improved (L) LE strength grossly >/= 4+/5 for improved function - PARTIALLY MET  5. Patient will consistently negotiate steps reciprocally and ambulate community distances on level and unlevel terrain without AD - NOT MET  6. Patient will have improved LEFS score >/= 50/80 for subjective evidence of functional improvement - NOT ASSESSED      Progress toward previous goals: Partially Met  Recommendations: Continue as planned  Timeframe: 6 weeks  Prognosis to achieve goals: good    PT Signature: Monserrat Carroll, PT, DPT, OCS  [place] License # [id number]      Based upon review of the patient's progress and continued therapy plan, it is my medical opinion that Everton Murguia should continue physical therapy treatment at [facility]  [address] [place] [address] [address]  [facility] [id number] [phone].    Signature: __________________________________  Mally  KENDRA Ortiz    Manual Therapy:         mins  05888;  Therapeutic Exercise:    35     mins  64490;     Neuromuscular Alicia:        mins  09383;    Therapeutic Activity:     8     mins  19310;     Gait Training:           mins  50384;     Ultrasound:          mins  50467;    Electrical Stimulation:         mins  94589 ( );  Dry Needling          mins self-pay    Timed Treatment:   43   mins   Total Treatment:     49   mins    Please sign and return via fax to [phone]. Thank you, [facility].

## 2022-10-28 ENCOUNTER — TREATMENT (OUTPATIENT)
Dept: PHYSICAL THERAPY | Facility: CLINIC | Age: 72
End: 2022-10-28

## 2022-10-28 DIAGNOSIS — M25.562 ACUTE POSTOPERATIVE PAIN OF LEFT KNEE: ICD-10-CM

## 2022-10-28 DIAGNOSIS — Z96.652 STATUS POST TOTAL LEFT KNEE REPLACEMENT: Primary | ICD-10-CM

## 2022-10-28 DIAGNOSIS — G89.18 ACUTE POSTOPERATIVE PAIN OF LEFT KNEE: ICD-10-CM

## 2022-10-28 DIAGNOSIS — Z74.09 IMPAIRED FUNCTIONAL MOBILITY AND ACTIVITY TOLERANCE: ICD-10-CM

## 2022-10-28 DIAGNOSIS — M25.662 STIFFNESS OF LEFT KNEE: ICD-10-CM

## 2022-10-28 DIAGNOSIS — R26.9 GAIT DISTURBANCE: ICD-10-CM

## 2022-10-28 DIAGNOSIS — R26.2 DIFFICULTY WALKING: ICD-10-CM

## 2022-10-28 PROCEDURE — 97110 THERAPEUTIC EXERCISES: CPT | Performed by: PHYSICAL THERAPIST

## 2022-10-28 PROCEDURE — 97530 THERAPEUTIC ACTIVITIES: CPT | Performed by: PHYSICAL THERAPIST

## 2022-10-28 NOTE — PROGRESS NOTES
"Physical Therapy Daily Progress Note      Visit # 10      Subjective Evaluation    History of Present Illness    Subjective comment: Pt reports his (L) knee \"feels pretty good\".       Objective   See Exercise, Manual, and Modality Logs for complete treatment.       Assessment & Plan     Assessment    Assessment details: Pt completed treatment with no c/o pain in (L) knee.  Progressed reps on steps( height was progressed last treatment).  Pt had trouble performing fwd step up without pulling himself up.                       Manual Therapy:    0     mins  94543;  Therapeutic Exercise:    30     mins  24912;     Neuromuscular Alicia:    0    mins  77460;    Therapeutic Activity:     10     mins  39765;     Gait Trainin     mins  34290;     Ultrasound:     0     mins  79840;    Work Hardening           0      mins 79334  Iontophoresis               0   mins 95736  E-Stim                          _0_ mins 16724 ( )    Timed Treatment:   40   mins   Total Treatment:     40   mins    Ankush Fernandez PTA  Physical Therapist Assistant  "

## 2022-11-02 ENCOUNTER — TREATMENT (OUTPATIENT)
Dept: PHYSICAL THERAPY | Facility: CLINIC | Age: 72
End: 2022-11-02

## 2022-11-02 DIAGNOSIS — M17.12 PRIMARY OSTEOARTHRITIS OF LEFT KNEE: ICD-10-CM

## 2022-11-02 DIAGNOSIS — M25.662 STIFFNESS OF LEFT KNEE: ICD-10-CM

## 2022-11-02 DIAGNOSIS — Z74.09 IMPAIRED FUNCTIONAL MOBILITY AND ACTIVITY TOLERANCE: ICD-10-CM

## 2022-11-02 DIAGNOSIS — R26.2 DIFFICULTY WALKING: ICD-10-CM

## 2022-11-02 DIAGNOSIS — M25.562 CHRONIC PAIN OF LEFT KNEE: ICD-10-CM

## 2022-11-02 DIAGNOSIS — G89.29 CHRONIC PAIN OF LEFT KNEE: ICD-10-CM

## 2022-11-02 DIAGNOSIS — M25.562 ACUTE POSTOPERATIVE PAIN OF LEFT KNEE: ICD-10-CM

## 2022-11-02 DIAGNOSIS — R26.9 GAIT DISTURBANCE: ICD-10-CM

## 2022-11-02 DIAGNOSIS — Z96.652 STATUS POST TOTAL LEFT KNEE REPLACEMENT: Primary | ICD-10-CM

## 2022-11-02 DIAGNOSIS — G89.18 ACUTE POSTOPERATIVE PAIN OF LEFT KNEE: ICD-10-CM

## 2022-11-02 PROCEDURE — 97530 THERAPEUTIC ACTIVITIES: CPT | Performed by: PHYSICAL THERAPIST

## 2022-11-02 PROCEDURE — 97110 THERAPEUTIC EXERCISES: CPT | Performed by: PHYSICAL THERAPIST

## 2022-11-02 NOTE — PROGRESS NOTES
"Physical Therapy Daily Treatment Note  [address], [address]  [place] [address]  Visit # 11      Subjective Evaluation    History of Present Illness    Subjective comment: Pt denies any pain in (L) knee today.  He states that his knee is \"getting better daily.\"       Objective   See Exercise, Manual, and Modality Logs for complete treatment.       Assessment & Plan     Assessment    Assessment details: Pt tolerated treatment with no c/o pain in (L) knee.  He was able to progress volume of strengthening exercise with no issues.  Pt has made good progress in increasing his ROM and strength in his (L) knee for a full return to ADL's                     Manual Therapy:    0     mins  89688;  Therapeutic Exercise:    27     mins  68849;     Neuromuscular Alicia:    0    mins  60790;    Therapeutic Activity:     11     mins  04078;     Gait Trainin     mins  90555;     Ultrasound:     0     mins  35621;    Work Hardening           0      mins 01626  Iontophoresis               0   mins 77783  E-Stim                          _0_ mins 24854 ( )    Timed Treatment:   37   mins   Total Treatment:     37   mins    Ankush Fernandez PTA  Physical Therapist Assistant  "

## 2022-11-04 ENCOUNTER — TREATMENT (OUTPATIENT)
Dept: PHYSICAL THERAPY | Facility: CLINIC | Age: 72
End: 2022-11-04

## 2022-11-04 DIAGNOSIS — R26.9 GAIT DISTURBANCE: ICD-10-CM

## 2022-11-04 DIAGNOSIS — M25.562 ACUTE POSTOPERATIVE PAIN OF LEFT KNEE: ICD-10-CM

## 2022-11-04 DIAGNOSIS — G89.18 ACUTE POSTOPERATIVE PAIN OF LEFT KNEE: ICD-10-CM

## 2022-11-04 DIAGNOSIS — M25.662 STIFFNESS OF LEFT KNEE: Primary | ICD-10-CM

## 2022-11-04 DIAGNOSIS — Z96.652 STATUS POST TOTAL LEFT KNEE REPLACEMENT: ICD-10-CM

## 2022-11-04 PROCEDURE — 97530 THERAPEUTIC ACTIVITIES: CPT | Performed by: PHYSICAL THERAPIST

## 2022-11-04 PROCEDURE — 97110 THERAPEUTIC EXERCISES: CPT | Performed by: PHYSICAL THERAPIST

## 2022-11-10 ENCOUNTER — OFFICE VISIT (OUTPATIENT)
Dept: ORTHOPEDIC SURGERY | Facility: CLINIC | Age: 72
End: 2022-11-10

## 2022-11-10 VITALS — TEMPERATURE: 96.9 F | WEIGHT: 208.4 LBS | HEIGHT: 74 IN | BODY MASS INDEX: 26.75 KG/M2

## 2022-11-10 DIAGNOSIS — Z96.652 S/P TKR (TOTAL KNEE REPLACEMENT), LEFT: Primary | ICD-10-CM

## 2022-11-10 PROCEDURE — 73562 X-RAY EXAM OF KNEE 3: CPT | Performed by: NURSE PRACTITIONER

## 2022-11-10 PROCEDURE — 99024 POSTOP FOLLOW-UP VISIT: CPT | Performed by: NURSE PRACTITIONER

## 2022-11-10 NOTE — PROGRESS NOTES
Everton Murguia : 1950 MRN: [id number] DATE: 11/10/2022    DIAGNOSIS: 8 week follow up left total knee      SUBJECTIVE:Patient returns today for 8 week follow up of left total knee replacement. Patient reports doing well with no unusual complaints. Appears to be progressing appropriately.  Patient reports that the pain level is very tolerable just has some aches and stiffness.  He is finished physical therapy at [facility] and is reportedly doing well.  He denies any signs or symptoms of infection, and he is without any other significant complaints today.    OBJECTIVE:   Exam:. The incision is well healed. No sign of infection. Range of motion is measured at 2 to 115 AROM 0 to 122 PROM. The calf is soft and nontender with a negative Homans sign. Strength is progressing and the patient is ambulating appropriately.    DIAGNOSTIC STUDIES  Xrays: 3 views of the left knee (AP, lateral, and sunrise) were ordered and reviewed for evaluation of recent knee replacement. They demonstrate a well positioned, well aligned knee replacement without complicating factors noted. In comparison with previous films there has been no change.    ASSESSMENT: 8 week status post left knee replacement.    PLAN: 1) Continue with PT exercises as prescribed   2) Follow up in 10 months for annual visit    KENDRA Luevano  11/10/2022

## 2023-01-23 ENCOUNTER — OFFICE VISIT (OUTPATIENT)
Dept: ORTHOPEDIC SURGERY | Facility: CLINIC | Age: 73
End: 2023-01-23
Payer: MEDICARE

## 2023-01-23 VITALS — WEIGHT: 186.4 LBS | TEMPERATURE: 97.3 F | HEIGHT: 74 IN | BODY MASS INDEX: 23.92 KG/M2

## 2023-01-23 DIAGNOSIS — M25.511 ACUTE PAIN OF RIGHT SHOULDER: Primary | ICD-10-CM
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
## 2023-01-23 DIAGNOSIS — M43.22 FUSION OF SPINE, CERVICAL REGION: ICD-10-CM

## 2023-01-23 PROCEDURE — 99214 OFFICE O/P EST MOD 30 MIN: CPT | Performed by: NURSE PRACTITIONER

## 2023-01-23 PROCEDURE — 20610 DRAIN/INJ JOINT/BURSA W/O US: CPT | Performed by: NURSE PRACTITIONER

## 2023-01-23 RX ORDER — CYCLOBENZAPRINE HCL 10 MG
TABLET ORAL
COMMUNITY
Start: 2023-01-12

## 2023-01-23 RX ADMIN — METHYLPREDNISOLONE ACETATE 80 MG: 80 INJECTION, SUSPENSION INTRA-ARTICULAR; INTRALESIONAL; INTRAMUSCULAR; SOFT TISSUE at 10:01

## 2023-01-23 RX ADMIN — LIDOCAINE HYDROCHLORIDE 2 ML: 10 INJECTION, SOLUTION EPIDURAL; INFILTRATION; INTRACAUDAL; PERINEURAL at 10:01

## 2023-01-23 NOTE — PROGRESS NOTES
[facility]  New Problem      Patient Name: Everton Murguia  : 1950  Primary Care Physician: Justin Diaz MD        Chief Complaint: Right shoulder pain    HPI:   Everton Murguia is a 73 y.o. year old who presents today for evaluation of right shoulder pain.  Patient has a recent history of a traumatic fall on 2022, ultimately required a cervical spine fusion due to C2 fracture and neurologic involvement.  His surgeon is Dr. Rich at [place].  He continues to have some weakness in both of his upper extremities, numbness and tingling has improved after his fusion.  He hit his shoulder pretty forcefully at the time of the fall and is here to be evaluated for shoulder pathology.  He had x-rays with primary care and was evaluated there, and in reviewing their notes seems that they are suspecting cervical spine pathology as the primary source of his symptoms.  He is here today with new x-rays for further evaluation and treatment.  He says he is having more more pain in the right shoulder that was not initially present.  He is in a cervical collar and has follow-up scheduled with both the nurse practitioner and the surgeon coming up in the next couple of weeks.  He continues to do neuro rehab.    Past Medical/Surgical, Social and Family History:  I have reviewed and/or updated pertinent history as noted in the medical record including:  Past Medical History:   Diagnosis Date   • Arthritis    • Hypertension    • Knee pain, bilateral    • Polio    • Thoracic disc disorder    • Type 2 diabetes mellitus, with long-term current use of insulin (HCC)      Past Surgical History:   Procedure Laterality Date   • TOTAL KNEE ARTHROPLASTY Left 2022    Procedure: TOTAL KNEE ARTHROPLASTY WITH CORI ROBOT;  Surgeon: Heriberto Rodríguez MD;  [facility];  Service: Orthopedics;  Laterality: Left;     Social History     Occupational History   • Not on file   Tobacco Use   • Smoking status: Never    • Smokeless tobacco: Never   Vaping Use   • Vaping Use: Never used   Substance and Sexual Activity   • Alcohol use: Never   • Drug use: Never   • Sexual activity: Not on file          Allergies: No Known Allergies    Medications:   Home Medications:  Current Outpatient Medications on File Prior to Visit   Medication Sig   • amLODIPine (NORVASC) 5 MG tablet Take 5 mg by mouth Daily.   • cyclobenzaprine (FLEXERIL) 10 MG tablet    • metFORMIN (GLUCOPHAGE) 500 MG tablet Take 500 mg by mouth 2 (Two) Times a Day With Meals.   • tamsulosin (FLOMAX) 0.4 MG capsule 24 hr capsule Take 0.4 mg by mouth 2 (Two) Times a Day.   • HYDROcodone-acetaminophen (NORCO) 7.5-325 MG per tablet Take 1-2 tablets by mouth Every 4 (Four) to 6 (Six) Hours As Needed for pain. Take 2 only when in severe pain.   • ondansetron (Zofran) 4 MG tablet Take 1 tablet by mouth Every 8 (Eight) Hours As Needed for Nausea or Vomiting for up to 10 doses.     Current Facility-Administered Medications on File Prior to Visit   Medication   • Chlorhexidine Gluconate Cloth 2 % pads         ROS:  14 point review of systems was negative except as listed in the HPI.    Physical Exam:   73 y.o. male  Body mass index is 23.93 kg/m²., 84.6 kg (186 lb 6.4 oz)  Vitals:    01/23/23 0856   Temp: 97.3 °F (36.3 °C)     General: Alert, cooperative, appears well and in no observable distress. Appears stated age and BMI as listed above.  HEENT: Normocephalic, atraumatic on external visual inspection.  CV: No significant peripheral edema.  Respiratory: Normal respiratory effort.  Skin: Warm & well perfused; appropriate skin turgor.  Psych: Appropriate mood & affect.  Neuro: Gross sensation and motor intact in affected extremity/extremities.  Vascular: Peripheral pulses palpable in affected extremity/extremities.     MSK Exam:  Patient is in a cervical collar and appears in no acute distress.  He does not have any deformities or wounds about his shoulder.  He is grossly limited  in his active range of motion but tolerates full passive range of motion with flexion external rotation and internal rotation.  He does have pain at terminal motion with passive range.  Motor strength is weak globally including bicep flexion, he does have some effort with tricep extension and distally.  Both shoulders exhibit weakness.    Radiology:    The following X-rays were ordered/reviewed today to evaluate the patient's symptoms: Shoulder: AP, Scapular Y and Axillary Lateral of right shoulder(s) show Some mild degenerative changes at the AC joint, some changes at the greater tuberosity, no significant glenohumeral or subacromial interval narrowing and no other obvious acute bony pathology on plain film.. There are no prior films available for direct comparison.    Procedure:   See Procedure Note: The potential risks and benefits of performing a diagnostic and therapeutic injection were discussed with the patient prior to procedure. Risks include, but are not limited to infection, swelling, transient increase in pain, bleeding, bruising. Patient was advised that injections are a diagnostic and therapeutic tool meaning they may not alleviate symptoms at all, or may only provide partial or temporary relief. Injection precautions and aftercare discussed. and Patient has a history of diabetes and therefore may be at increased risk of hyperglycemia following an injection of corticosteroid. These increases in BG are usually transient and resolve within a few days. Patient was advised on the potential for hyperglycemia and encouraged to self monitor for s/sx hyperglycemia and to self monitor BG. Patient encouraged to call the office for any significant hyperglycemia and/or hyperglycemia that does not resolve within 3-5 days. Injection precautions and aftercare discussed.    Large Joint Arthrocentesis: R subacromial bursa  Date/Time: 1/23/2023 10:01 AM  Consent given by: patient  Site marked: site marked  Timeout:  Immediately prior to procedure a time out was called to verify the correct patient, procedure, equipment, support staff and site/side marked as required   Supporting Documentation  Indications: pain   Procedure Details  Location: shoulder - R subacromial bursa  Preparation: Patient was prepped and draped in the usual sterile fashion  Needle gauge: 21G.  Approach: posterior  Medications administered: 80 mg methylPREDNISolone acetate 80 MG/ML; 2 mL lidocaine PF 1% 1 %  Patient tolerance: patient tolerated the procedure well with no immediate complications          Misc. Data/Labs: N/A    Assessment & Plan:    ICD-10-CM ICD-9-CM   1. Acute pain of right shoulder  M25.511 719.41   2. Fall, subsequent encounter  W19.XXXD V58.89     E888.9   3. Fusion of spine, cervical region  M43.22 724.9     No orders of the defined types were placed in this encounter.    Orders Placed This Encounter   Procedures   • Large Joint Arthrocentesis: R subacromial bursa   • Ambulatory Referral to Physical Therapy       This is a 73-year-old male with a traumatic C2 fracture that required spinal fusion.  He has had upper extremity weakness but developed shoulder pain on the right and did take a forceful blow at the time of his fall onto that shoulder.  I think it is difficult to assess for rotator cuff pathology with his weakness and he would really not be a candidate for anything surgical at this time.  I do think the majority of his symptoms are related to his cervical spine or nerve injury.  We talked about different options and I am going to do a subacromial injection as a diagnostic tool and hopefully give him some relief in his pain.  I would recommend he continue working with physical therapy and I will asked him to add some modalities to keep the shoulder moving since he does have a history of diabetes, I think his risk of frozen shoulder is higher.    I will see him back in follow-up and will discuss if the injection helped his pain  at all.  I will follow along with his progress after his neurosurgery visit.  If all else fails we can certainly consider an MRI to better evaluate the cuff and shoulder in better detail if needed.     Return in about 4 weeks (around 2/20/2023) for Recheck.    Patient encouraged to call with questions or concerns prior to follow up.  Recommend ICE and/or HEAT PRN as discussed.  Will discuss with attending physician as needed.  Consider additional referrals, work up and/or advanced imaging as indicated or if patient fails to respond to conservative care.        Nathanael Briceño, APRN

## 2023-01-24 RX ORDER — METHYLPREDNISOLONE ACETATE 80 MG/ML
80 INJECTION, SUSPENSION INTRA-ARTICULAR; INTRALESIONAL; INTRAMUSCULAR; SOFT TISSUE
Status: COMPLETED | OUTPATIENT
Start: 2023-01-23 | End: 2023-01-23

## 2023-01-24 RX ORDER — LIDOCAINE HYDROCHLORIDE 10 MG/ML
2 INJECTION, SOLUTION EPIDURAL; INFILTRATION; INTRACAUDAL; PERINEURAL
Status: COMPLETED | OUTPATIENT
Start: 2023-01-23 | End: 2023-01-23

## 2023-02-21 ENCOUNTER — OFFICE VISIT (OUTPATIENT)
Dept: ORTHOPEDIC SURGERY | Facility: CLINIC | Age: 73
End: 2023-02-21
Payer: MEDICARE

## 2023-02-21 VITALS — BODY MASS INDEX: 24.52 KG/M2 | WEIGHT: 185 LBS | TEMPERATURE: 97.1 F | HEIGHT: 73 IN

## 2023-02-21 DIAGNOSIS — M67.911 DYSFUNCTION OF RIGHT ROTATOR CUFF: Primary | ICD-10-CM

## 2023-02-21 DIAGNOSIS — M25.511 ACUTE PAIN OF RIGHT SHOULDER: ICD-10-CM

## 2023-02-21 DIAGNOSIS — R52 PAIN: ICD-10-CM
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
## 2023-02-21 PROCEDURE — 73030 X-RAY EXAM OF SHOULDER: CPT | Performed by: NURSE PRACTITIONER

## 2023-02-21 PROCEDURE — 99213 OFFICE O/P EST LOW 20 MIN: CPT | Performed by: NURSE PRACTITIONER

## 2023-02-21 NOTE — PROGRESS NOTES
[facility]              Follow Up      Patient Name: Everton Murguia  : 1950  Primary Care Physician: Justin Diaz MD        Chief Complaint: Right shoulder pain    HPI:   Everton Murguia is a 73 y.o. year old who presents today for evaluation of right shoulder pain.  Patient has a recent history of a traumatic fall on 2022, ultimately required a cervical spine fusion due to C2 fracture and neurologic involvement.  His surgeon is Dr. Rich at [place].  He continues to have some weakness in both of his upper extremities, numbness and tingling has improved after his fusion.  He hit his shoulder pretty forcefully at the time of the fall.    At the last visit 4 weeks ago we elected to try subacromial injection.  He says he feels like he may have gotten some relief in his pain symptoms but overall no improvement in his range of motion.  He is doing physical therapy.  I reviewed the notes from Dr. Rich as well.  He is having a little bit of left shoulder pain as well today.    Past Medical/Surgical, Social and Family History:  I have reviewed and/or updated pertinent history as noted in the medical record including:  Past Medical History:   Diagnosis Date   • Arthritis    • Hypertension    • Knee pain, bilateral    • Polio    • Thoracic disc disorder    • Type 2 diabetes mellitus, with long-term current use of insulin ([facility])      Past Surgical History:   Procedure Laterality Date   • TOTAL KNEE ARTHROPLASTY Left 2022    Procedure: TOTAL KNEE ARTHROPLASTY WITH CORI ROBOT;  Surgeon: Heriberto Rodríguez MD;  Location: [facility];  Service: Orthopedics;  Laterality: Left;     Social History     Occupational History   • Not on file   Tobacco Use   • Smoking status: Never     Passive exposure: Never   • Smokeless tobacco: Never   Vaping Use   • Vaping Use: Never used   Substance and Sexual Activity   • Alcohol use: Never   • Drug use: Never   • Sexual activity: Not on file           Allergies: No Known Allergies    Medications:   Home Medications:  Current Outpatient Medications on File Prior to Visit   Medication Sig   • amLODIPine (NORVASC) 5 MG tablet Take 5 mg by mouth Daily.   • metFORMIN (GLUCOPHAGE) 500 MG tablet Take 500 mg by mouth 2 (Two) Times a Day With Meals.   • cyclobenzaprine (FLEXERIL) 10 MG tablet    • HYDROcodone-acetaminophen (NORCO) 7.5-325 MG per tablet Take 1-2 tablets by mouth Every 4 (Four) to 6 (Six) Hours As Needed for pain. Take 2 only when in severe pain.   • ondansetron (Zofran) 4 MG tablet Take 1 tablet by mouth Every 8 (Eight) Hours As Needed for Nausea or Vomiting for up to 10 doses.   • tamsulosin (FLOMAX) 0.4 MG capsule 24 hr capsule Take 0.4 mg by mouth 2 (Two) Times a Day.     Current Facility-Administered Medications on File Prior to Visit   Medication   • Chlorhexidine Gluconate Cloth 2 % pads         ROS:  ROS negative except as listed in the HPI.    Physical Exam:   73 y.o. male  Body mass index is 24.41 kg/m²., 83.9 kg (185 lb)  Vitals:    02/21/23 1007   Temp: 97.1 °F (36.2 °C)     General: Alert, cooperative, appears well and in no observable distress. Appears stated age and BMI as listed above.  HEENT: Normocephalic, atraumatic on external visual inspection.  CV: No significant peripheral edema.  Respiratory: Normal respiratory effort.  Skin: Warm & well perfused; appropriate skin turgor.  Psych: Appropriate mood & affect.  Neuro: Gross sensation and motor intact in affected extremity/extremities.  Vascular: Peripheral pulses palpable in affected extremity/extremities.     MSK Exam:  Patient remains in a cervical collar.  Physical exam of the right shoulder reveals no obvious deformities.  He has 0 degrees of active range of motion, passively I can move him through full range of motion but pain at terminal motions and through the rotator cuff interval.  His active bicep flexion is better today on exam and has full bicep flexion in both upper  extremities.  Left upper extremity he can actively forward flex to 175 degrees with pain at terminal motion, external rotation 40 degrees, internal rotation to the lower lumbar spine.  He has diffuse weakness with isometric testing of the Rotator Cuff on the left, he has no ability to perform isometric testing on the right side.     Radiology:    The following X-rays were ordered/reviewed today to evaluate the patient's symptoms: Shoulder: AP, Scapular Y and Axillary Lateral of right shoulder(s) show Some degenerative changes at the acromioclavicular joint, some sclerosis at the greater tuberosity,.  No significant glenohumeral narrowing no other obvious acute bony pathology.  These films are stable when compared with prior films..    Procedure:   N/A      Misc. Data/Labs: N/A    Assessment & Plan:    ICD-10-CM ICD-9-CM   1. Dysfunction of right rotator cuff  M67.911 726.10   2. Acute pain of right shoulder  M25.511 719.41   3. Fall, subsequent encounter  W19.XXXD V58.89     E888.9   4. Pain  R52 780.96     No orders of the defined types were placed in this encounter.    Orders Placed This Encounter   Procedures   • XR Shoulder 2+ View Right   • MRI Shoulder Right Without Contrast     This is a 73-year-old male with right shoulder rotator cuff dysfunction.  He obviously has some underlying cervical spine pathology however it is difficult to differentiate if his cuff could be contributing and there is concern he could have a full-thickness rotator cuff tear.  Plan is to proceed with an MRI to better evaluate and treat his condition.  After 2 months of conservative care and physical therapy his shoulder motion is not improved and he has specific shoulder pain.  He did get some pain relief with the injection which makes me more suspicious about some underlying RTC pathology.  Nonetheless MRI will better help guide next steps in his care.  Patient wishes to have an open MRI.    Return for follow up after the  MRI.    Patient encouraged to call with questions or concerns prior to follow up.  Recommend ICE and/or HEAT PRN as discussed.  Will discuss with attending physician as needed.  Consider additional referrals, work up and/or advanced imaging as indicated or if patient fails to respond to conservative care.        Nathanael Briceño, KENDRA      Dictated Utilizing Dragon Dictation

## 2023-03-17 ENCOUNTER — HOSPITAL ENCOUNTER (OUTPATIENT)
Dept: MRI IMAGING | Facility: HOSPITAL | Age: 73
Discharge: HOME | End: 2023-03-17
Payer: MEDICARE

## 2023-03-17 DIAGNOSIS — M67.911 DYSFUNCTION OF RIGHT ROTATOR CUFF: ICD-10-CM

## 2023-03-17 DIAGNOSIS — M25.511 ACUTE PAIN OF RIGHT SHOULDER: ICD-10-CM
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
## 2023-03-21 ENCOUNTER — TELEPHONE (OUTPATIENT)
Dept: ORTHOPEDIC SURGERY | Facility: CLINIC | Age: 73
End: 2023-03-21

## 2023-03-21 DIAGNOSIS — F40.240 CLAUSTROPHOBIA: Primary | ICD-10-CM

## 2023-03-21 RX ORDER — DIAZEPAM 5 MG/1
TABLET ORAL
Qty: 1 TABLET | Refills: 0 | Status: SHIPPED | OUTPATIENT
Start: 2023-03-21

## 2023-03-21 NOTE — TELEPHONE ENCOUNTER
Caller: DONOVAN   Relationship to Patient:SELF   Phone Number: [phone]   Reason for Call: PATIENT CALLING STATINFADUMOHARHETT HE COULD NOT MAKE IT THRU HIS MRI ON 03/17/1=23 HE IS RESCHEDULED FOR 04/12/23 BUT IS REQUESTING MEDICATION TO HELP CALM HIM DOWN SENT TO [facility]

## 2023-04-12 ENCOUNTER — HOSPITAL ENCOUNTER (OUTPATIENT)
Dept: MRI IMAGING | Facility: HOSPITAL | Age: 73
Discharge: HOME | End: 2023-04-12
Admitting: NURSE PRACTITIONER
Payer: MEDICARE

## 2023-04-12 PROCEDURE — 73221 MRI JOINT UPR EXTREM W/O DYE: CPT

## 2023-04-14 DIAGNOSIS — M75.111 INCOMPLETE TEAR OF RIGHT ROTATOR CUFF, UNSPECIFIED WHETHER TRAUMATIC: Primary | ICD-10-CM

## 2023-04-14 DIAGNOSIS — M19.019 SHOULDER ARTHRITIS: ICD-10-CM

## 2023-05-04 ENCOUNTER — TREATMENT (OUTPATIENT)
Dept: PHYSICAL THERAPY | Facility: CLINIC | Age: 73
End: 2023-05-04
Payer: MEDICARE

## 2023-05-04 DIAGNOSIS — S46.011D TRAUMATIC INCOMPLETE TEAR OF RIGHT ROTATOR CUFF, SUBSEQUENT ENCOUNTER: ICD-10-CM

## 2023-05-04 DIAGNOSIS — M25.511 ACUTE PAIN OF RIGHT SHOULDER: Primary | ICD-10-CM

## 2023-05-04 DIAGNOSIS — Z91.81 HISTORY OF FALL: ICD-10-CM

## 2023-05-04 NOTE — PROGRESS NOTES
"Physical Therapy Initial Evaluation and Plan of Care  [address] [facility], [address], [place] [id number]    Patient: Everton Murguia   : 1950  Diagnosis/ICD-10 Code:  Acute pain of right shoulder [M25.511]  Referring practitioner: KENDRA Burgos    Subjective Evaluation    History of Present Illness  Date of onset: 2022  Mechanism of injury: Patient reports on 2022 he was sitting EOB and became faint, falling to the floor.  He was transported to ER by ambulance where he was found to have \"Nondisplaced anterior inferior endplate fracture of C4, edematous changes at C3-C4 spinal cord level, and central cord syndrome.\" ACDF C3-6 was performed on 2022 by Dr. Rich.  He c/o pain in his (R) shoulder as well which has persisted, so once his neck was healed from surgery he underwent MRI of (R) shoulder and found patient to have incomplete tear of (R) infraspinatus and degenerative labral tear.  He had received a (R) shoulder injection prior to MRI which was not significantly helpful.      He reports pain and difficulty raising (R) UE, eating, shaving, washing his hair, getting dressed, lifting, weightbearing through (R) UE, and carrying items. His main c/o is the significant weakness and inability to move and use (R) UE.    PMH notable for (L) TKA, C3-6 ACDF, hx polio, and DM 2.         Patient Occupation: N/A  Quality of life: good    Pain  Current pain ratin  At best pain ratin  At worst pain ratin  Location: (R) anterior superior shoulder  Quality: dull ache  Aggravating factors: overhead activity, lifting, movement, repetitive movement and outstretched reach  Progression: improved (\"a little better\")    Social Support  Lives with: spouse    Hand dominance: right    Diagnostic Tests  MRI studies: abnormal (incomplete tear of (R) infraspinatus)    Patient Goals  Patient goals for therapy: increased motion, increased strength and independence with ADLs/IADLs  Patient goal: " be able to raise (R) UE, move and use it better           Subjective Questionnaire: QuickDASH: 20.45%    Objective          Tenderness     Additional Tenderness Details  Min (+) TTP (R) LHB tendon, proximal biceps mm.    Active Range of Motion   Left Shoulder   Flexion: 151 degrees   Extension: 43 degrees   Abduction: 153 degrees   External rotation 90°: 83 degrees   Internal rotation 90°: 54 degrees     Right Shoulder   Flexion: 34 degrees with pain  Extension: 33 degrees   Abduction: 31 degrees with pain  External rotation 0°: 32 degrees   Internal rotation BTB: L4 with pain    Additional Active Range of Motion Details  Patient exhibits insufficient (R) shoulder AROM to assess IR/ER from any position other than neutral/0 deg abd    Strength/Myotome Testing     Left Shoulder     Planes of Motion   Flexion: 5   Extension: 5   Abduction: 5   External rotation at 0°: 4+   Internal rotation at 0°: 5     Left Elbow   Flexion: 5  Extension: 5    Additional Strength Details  Patient exhibits profound limitation of (R) shoulder AROM which renders him unable to assume MMT positions at this time    Functional Assessment     Comments  Patient requires mod A of PT for supine to sit transfer due to inability to tolerate weightbearing through (R) UE          Assessment & Plan     Assessment  Impairments: abnormal coordination, abnormal muscle firing, abnormal or restricted ROM, activity intolerance, impaired physical strength, lacks appropriate home exercise program and pain with function  Functional Limitations: carrying objects, lifting, pulling, pushing, uncomfortable because of pain, moving in bed, reaching behind back, reaching overhead and unable to perform repetitive tasks  Assessment details: Patient is a 73 y.o male who suffered a fall on 12/26/2022 from the edge of his bed to the floor.  He suffered a C4 fracture which was surgically stabilized 3 days later.  He experienced immediate onset of (R) shoulder pain with the  fall but was not able to undergo MRI imaging until the spring once his neck had healed. He was found to have an incomplete tear of the (R) infraspinatus muscle.  He reports symptoms 0-2/10 with the main complaint of inability to elevate his (R) UE or use it for normal self care and ADL tasks.  He exhibits localized tenderness of (R) shoulder, profoundly decreased (R) shoulder AROM and a significant impairment of strength of his dominant (R) UE.  His QuickDASH score is 20.45% indicating a moderate perceived level of functional limitation.  He will benefit from skilled PT services to address these deficits, optimize functional recovery, and increase independence with self care tasks and ADLs for improved QOL.  Prognosis: good    Goals  Plan Goals: STGs: to be met in 6 weeks  1. Patient will be independent with initial HEP  2. Patient will demonstrate improved (R) shoulder elevation >/= 60 deg for improved ability to perform self care/grooming/feeding tasks  3. Patient will demonstrate ability to sufficiently weightbear through (R) UE to allow him to consistently perform supine to sit transfers independently  4. Patient will perform 10 x 10 sec (R) shoulder isometrics all planes without pain or muscle fatigue to indicate improving functional strength of (R) UE    LTGs: to be met in 12 weeks  1. Patient will be independent with progressed HEP  2. Patient will demonstrate improved (R) shoulder AROM >/= 120 deg for improved overhead reach  3. Patient will demonstrate improved strength of (R) UE >/= 4/5 all planes for improved functional use of dominant (R) UE  4. Patient will have improved QuickDASH score </= 10% for subjective evidence of functional improvement  5. Patient will return to performance of all self care and ADL tasks at >/= 75% prior abliity      Plan  Therapy options: will be seen for skilled therapy services  Planned modality interventions: cryotherapy and thermotherapy (hydrocollator packs)  Planned  therapy interventions: ADL retraining, fine motor coordination training, flexibility, functional ROM exercises, home exercise program, joint mobilization, manual therapy, neuromuscular re-education, soft tissue mobilization, strengthening, stretching and therapeutic activities  Frequency: 2x week  Duration in weeks: 12  Treatment plan discussed with: patient        Manual Therapy:         mins  99893;  Therapeutic Exercise:    19     mins  62857;     Neuromuscular Alicia:        mins  31379;    Therapeutic Activity:     12     mins  17712;     Gait Training:           mins  31647;     Ultrasound:          mins  20291;    Electrical Stimulation:         mins  32760 ( );  Dry Needling          mins self-pay    Timed Treatment:   31   mins   Total Treatment:     47   mins    PT SIGNATURE: Monserrat Carroll PT, DPT, OCS  Electronically signed by: Monserrat Carroll PT, 05/04/23, 8:28 AM EDT  [place] License #[id number]     DATE TREATMENT INITIATED: 5/4/2023    Medicare Initial Certification  Certification Period: 5/4/2023 thru 8/1/2023  I certify that the therapy services are furnished while this patient is under my care.  The services outlined above are required by this patient, and will be reviewed every 90 days.     PHYSICIAN: Nathanael Briceño APRN  [id number]                                          DATE:     Please sign and return via fax to [phone]. Thank you, [facility].

## 2023-05-09 ENCOUNTER — TREATMENT (OUTPATIENT)
Dept: PHYSICAL THERAPY | Facility: CLINIC | Age: 73
End: 2023-05-09
Payer: MEDICARE

## 2023-05-09 DIAGNOSIS — S46.011D TRAUMATIC INCOMPLETE TEAR OF RIGHT ROTATOR CUFF, SUBSEQUENT ENCOUNTER: ICD-10-CM

## 2023-05-09 DIAGNOSIS — M25.511 ACUTE PAIN OF RIGHT SHOULDER: Primary | ICD-10-CM

## 2023-05-09 DIAGNOSIS — Z91.81 HISTORY OF FALL: ICD-10-CM

## 2023-05-09 NOTE — PROGRESS NOTES
Physical Therapy Daily Treatment Note      [address] [facility], [address], [place] [address]    Patient: Everton Murguia   : 1950  Referring practitioner: No ref. provider found  Date of Initial Visit: Type: THERAPY  Noted: 2023  Today's Date: 2023  Patient seen for 2 sessions         Visit Diagnoses:     ICD-10-CM ICD-9-CM   1. Acute pain of right shoulder  M25.511 719.41   2. Traumatic incomplete tear of right rotator cuff, subsequent encounter  S46.011D V58.89     840.4   3. History of fall  Z91.81 V15.88         Subjective Evaluation    History of Present Illness    Subjective comment: My shoulder is feeling the same.  I've been doing the stretches at home but don't really feel like my shoulder is moving any further than it was.Pain  Current pain ratin           Objective   See Exercise, Manual, and Modality Logs for complete treatment.   *Initiated standing shoulder flex and abd AAROM with cane  *Initiated submax shld isometrics    Assessment & Plan     Assessment    Assessment details: Patient demonstrates good quantity and quality of (R) shoulder PROM flexion, abduction, and ER, all being near full with minimal discomfort.  Patient remains significantly limited in (R) shoulder AROM flexion, abduction, and ER rendering him non-functional with outstretched and overhead reaching tasks.  AAROM activities are progressed and submaximal shoulder isometrics are initiated in multiple planes to progress strengthening and increase stabilization of GH joint.          Progress per Plan of Care         Timed:  Manual Therapy:         mins  48334;  Therapeutic Exercise:    31     mins  88603;     Neuromuscular Alicia:        mins  61894;    Therapeutic Activity:          mins  61790;     Gait Training:           mins  65748;     Ultrasound:          mins  94071;    Untimed:  Electrical Stimulation:         mins  36225 ( );  Mechanical Traction:         mins  18454;   Dry Needling               ___  mins   18340    Timed Treatment:   31   mins   Total Treatment:     31   mins    Monserrat Carorll PT, DPT, OCS  Physical Therapist  [place] License #[id number]  Electronically signed by: Monserrat Carroll PT, 05/09/23, 8:53 AM EDT

## 2023-05-12 ENCOUNTER — TREATMENT (OUTPATIENT)
Dept: PHYSICAL THERAPY | Facility: CLINIC | Age: 73
End: 2023-05-12
Payer: MEDICARE

## 2023-05-12 DIAGNOSIS — M25.511 ACUTE PAIN OF RIGHT SHOULDER: Primary | ICD-10-CM

## 2023-05-12 DIAGNOSIS — S46.011D TRAUMATIC INCOMPLETE TEAR OF RIGHT ROTATOR CUFF, SUBSEQUENT ENCOUNTER: ICD-10-CM

## 2023-05-12 DIAGNOSIS — Z91.81 HISTORY OF FALL: ICD-10-CM

## 2023-05-12 NOTE — PROGRESS NOTES
Physical Therapy Daily Treatment Note      [address] [facility], [address], [place] [address]    Patient: Everton Murguia   : 1950  Referring practitioner: KENDRA Burgos  Date of Initial Visit: Type: THERAPY  Noted: 2023  Today's Date: 2023  Patient seen for 3 sessions         Visit Diagnoses:     ICD-10-CM ICD-9-CM   1. Acute pain of right shoulder  M25.511 719.41   2. Traumatic incomplete tear of right rotator cuff, subsequent encounter  S46.011D V58.89     840.4   3. History of fall  Z91.81 V15.88         Subjective Evaluation    History of Present Illness    Subjective comment: My shoulder is not hurting quite as bad and moving a little better.       Objective   See Exercise, Manual, and Modality Logs for complete treatment.   *Initiated pulleys for shoulder flexion and shoulder flexion walkbacks    Assessment & Plan     Assessment    Assessment details: Patient reports slight improvement in (R) shoulder mobility and pain.  He demonstrates near full (R) shoulder PROM flexion and abduction.  Actively, however, patient exhibits compensatory upper trap hyperactivity during early to midrange shoulder elevation, and he requires min assist of (L) UE during mid-range wall walk activity.          Progress strengthening /stabilization /functional activity         Timed:  Manual Therapy:         mins  36756;  Therapeutic Exercise:    32     mins  21963;     Neuromuscular Alicia:        mins  69397;    Therapeutic Activity:          mins  00339;     Gait Training:           mins  00102;     Ultrasound:          mins  23601;    Untimed:  Electrical Stimulation:         mins  41209 (MC );  Mechanical Traction:         mins  51561;   Dry Needling              ___  mins   33033    Timed Treatment:   32   mins   Total Treatment:     32   mins    Monserrat Carroll PT, DPT, OCS  Physical Therapist  [place] License #[id number]  Electronically signed by: Monserrat Carroll PT, 23, 8:29 AM EDT

## 2023-05-16 ENCOUNTER — TREATMENT (OUTPATIENT)
Dept: PHYSICAL THERAPY | Facility: CLINIC | Age: 73
End: 2023-05-16
Payer: MEDICARE

## 2023-05-16 DIAGNOSIS — S46.011D TRAUMATIC INCOMPLETE TEAR OF RIGHT ROTATOR CUFF, SUBSEQUENT ENCOUNTER: ICD-10-CM

## 2023-05-16 DIAGNOSIS — M25.511 ACUTE PAIN OF RIGHT SHOULDER: Primary | ICD-10-CM

## 2023-05-16 DIAGNOSIS — Z91.81 HISTORY OF FALL: ICD-10-CM

## 2023-05-16 NOTE — PROGRESS NOTES
Physical Therapy Daily Treatment Note      [address] [facility], [address], [place] [id number]    Patient: Everton Murguia   : 1950  Referring practitioner: KENDRA Burgos  Date of Initial Visit: Type: THERAPY  Noted: 2023  Today's Date: 2023  Patient seen for 4 sessions         Visit Diagnoses:     ICD-10-CM ICD-9-CM   1. Acute pain of right shoulder  M25.511 719.41   2. Traumatic incomplete tear of right rotator cuff, subsequent encounter  S46.011D V58.89     840.4   3. History of fall  Z91.81 V15.88         Subjective Evaluation    History of Present Illness    Subjective comment: I think my shoulder is doing a little better and moving a little better.       Objective   See Exercise, Manual, and Modality Logs for complete treatment.   *Added single UE table slides for shoulder flexion and scaption  *Increased repetitions of shoulder isometrics    Assessment & Plan     Assessment    Assessment details: Patient demonstrates near full (R) shoulder AAROM flexion and scaption.  He requires minimal assist of (L) UE to elevate (R) shoulder beyond 90 deg jackie during wall walk activity but once past  deg, patient is better able to elevate (R) UE active assisted using wall.  Actively against gravity, however, compensatory shoulder hike via upper trap overactivation is evident.          Progress per Plan of Care         Timed:  Manual Therapy:         mins  55775;  Therapeutic Exercise:    26     mins  41782;     Neuromuscular Alicia:    12    mins  18485;    Therapeutic Activity:          mins  97708;     Gait Training:           mins  49280;     Ultrasound:          mins  16988;    Untimed:  Electrical Stimulation:         mins  27466 ( );  Mechanical Traction:         mins  39494;   Dry Needling              ___  mins   86766    Timed Treatment:   38   mins   Total Treatment:     38   mins    Monserrat Carroll, PT, DPT, OCS  Physical Therapist  [place] License #[id number]  Electronically signed by:  Monserrat Carroll, PT, 05/16/23, 9:04 AM EDT

## 2023-05-18 ENCOUNTER — TREATMENT (OUTPATIENT)
Dept: PHYSICAL THERAPY | Facility: CLINIC | Age: 73
End: 2023-05-18
Payer: MEDICARE

## 2023-05-18 DIAGNOSIS — S46.011D TRAUMATIC INCOMPLETE TEAR OF RIGHT ROTATOR CUFF, SUBSEQUENT ENCOUNTER: ICD-10-CM

## 2023-05-18 DIAGNOSIS — M25.511 ACUTE PAIN OF RIGHT SHOULDER: Primary | ICD-10-CM

## 2023-05-18 DIAGNOSIS — Z91.81 HISTORY OF FALL: ICD-10-CM

## 2023-05-18 NOTE — PROGRESS NOTES
Physical Therapy Daily Treatment Note      [address] [facility], [address], [place] [id number]    Patient: Everton Murguia   : 1950  Referring practitioner: KENDRA Burgos  Date of Initial Visit: Type: THERAPY  Noted: 2023  Today's Date: 2023  Patient seen for 5 sessions         Visit Diagnoses:     ICD-10-CM ICD-9-CM   1. Acute pain of right shoulder  M25.511 719.41   2. Traumatic incomplete tear of right rotator cuff, subsequent encounter  S46.011D V58.89     840.4   3. History of fall  Z91.81 V15.88         Subjective Evaluation    History of Present Illness    Subjective comment: My shoulder is feeling a little better.       Objective   See Exercise, Manual, and Modality Logs for complete treatment.   *Increased hold times for isometrics    Assessment & Plan     Assessment    Assessment details: Despite normalizing (R) shoulder PROM all planes, patient's (R) shoulder AROM for elevation in all planes (flexion, scaption, and abduction) remains profoundly functionally deficient and patient exhibits a rotator cuff deficiency pattern of upper trap overutilization.  (R) UE elevation remains limited to approximately 35-40 deg, compensated.            Progress per Plan of Care         Timed:  Manual Therapy:         mins  37933;  Therapeutic Exercise:    29     mins  66724;     Neuromuscular Alicia:    10    mins  16521;    Therapeutic Activity:          mins  60432;     Gait Training:           mins  85773;     Ultrasound:          mins  41576;    Untimed:  Electrical Stimulation:         mins  28686 ( );  Mechanical Traction:         mins  76686;   Dry Needling              ___  mins   67081    Timed Treatment:   39   mins   Total Treatment:     39   mins    Monserrat Carroll PT, DPT, OCS  Physical Therapist  [place] License #[id number]  Electronically signed by: Monserrat Carroll PT, 23, 8:56 AM EDT

## 2023-05-23 ENCOUNTER — TREATMENT (OUTPATIENT)
Dept: PHYSICAL THERAPY | Facility: CLINIC | Age: 73
End: 2023-05-23
Payer: MEDICARE

## 2023-05-23 DIAGNOSIS — S46.011D TRAUMATIC INCOMPLETE TEAR OF RIGHT ROTATOR CUFF, SUBSEQUENT ENCOUNTER: ICD-10-CM

## 2023-05-23 DIAGNOSIS — Z91.81 HISTORY OF FALL: ICD-10-CM

## 2023-05-23 DIAGNOSIS — M25.511 ACUTE PAIN OF RIGHT SHOULDER: Primary | ICD-10-CM

## 2023-05-23 NOTE — PROGRESS NOTES
Physical Therapy Daily Treatment Note      [address] [facility], [address], [place] [id number]    Patient: Everton Murguia   : 1950  Referring practitioner: KENDRA Burgos  Date of Initial Visit: Type: THERAPY  Noted: 2023  Today's Date: 2023  Patient seen for 6 sessions         Visit Diagnoses:     ICD-10-CM ICD-9-CM   1. Acute pain of right shoulder  M25.511 719.41   2. Traumatic incomplete tear of right rotator cuff, subsequent encounter  S46.011D V58.89     840.4   3. History of fall  Z91.81 V15.88         Subjective Evaluation    History of Present Illness    Subjective comment: My shoulder is feeling better, but I still can't raise it by itself.       Objective   See Exercise, Manual, and Modality Logs for complete treatment.   *progressed table slides to 3 planes  *initiated seated shoulder flexion AAROM    Assessment & Plan     Assessment    Assessment details: Patient reports (R) shoulder symptoms have reduced and the passive mobility of his (R) glenohumeral joint has improved since initial evaluation, however he has unfortunately not recovered any notable ability to actively elevate (R) UE as yet.  Initiated (R) shoulder AAROM flexion against gravity with moderate assist of (L) UE.  (+) drop arm test (R) UE.           Progress per Plan of Care           Timed:  Manual Therapy:         mins  75764;  Therapeutic Exercise:    31     mins  39952;     Neuromuscular Alicia:    9    mins  43318;    Therapeutic Activity:          mins  24179;     Gait Training:           mins  24733;     Ultrasound:          mins  91620;    Untimed:  Electrical Stimulation:         mins  30629 ( );  Mechanical Traction:         mins  37405;   Dry Needling              ___  mins   77036    Timed Treatment:   40   mins   Total Treatment:     40   mins    Monserrat Carroll PT, DPT, OCS  Physical Therapist  [place] License #[id number]  Electronically signed by: Monserrat Carroll PT, 23, 9:01 AM EDT

## 2023-05-25 ENCOUNTER — TREATMENT (OUTPATIENT)
Dept: PHYSICAL THERAPY | Facility: CLINIC | Age: 73
End: 2023-05-25
Payer: MEDICARE

## 2023-05-25 DIAGNOSIS — Z91.81 HISTORY OF FALL: ICD-10-CM

## 2023-05-25 DIAGNOSIS — S46.011D TRAUMATIC INCOMPLETE TEAR OF RIGHT ROTATOR CUFF, SUBSEQUENT ENCOUNTER: ICD-10-CM

## 2023-05-25 DIAGNOSIS — M25.511 ACUTE PAIN OF RIGHT SHOULDER: Primary | ICD-10-CM

## 2023-05-25 NOTE — PROGRESS NOTES
Physical Therapy Daily Treatment Note      [address] [facility], [address], [place] [address]    Patient: Everton Murguia   : 1950  Referring practitioner: KENDRA Burgos  Date of Initial Visit: Type: THERAPY  Noted: 2023  Today's Date: 2023  Patient seen for 7 sessions         Visit Diagnoses:     ICD-10-CM ICD-9-CM   1. Acute pain of right shoulder  M25.511 719.41   2. Traumatic incomplete tear of right rotator cuff, subsequent encounter  S46.011D V58.89     840.4   3. History of fall  Z91.81 V15.88         Subjective Evaluation    History of Present Illness    Subjective comment: My shoulder is about the same -- I can't raise my right arm by itself. I see another orthopedic doctor on May 29.       Objective   See Exercise, Manual, and Modality Logs for complete treatment.       Assessment & Plan     Assessment    Assessment details: Patient continues to demonstrate near full (R) shoulder PROM all planes with minimal discomfort.  However, his (R) shoulder AROM remains quite minimal with notable upper trap compensation/shoulder hike.  Patient has been encouraged in persistent HEP compliance while he awaits outcome of appointment with different orthopedic MD next week.          Awaiting MD orders         Timed:  Manual Therapy:         mins  30126;  Therapeutic Exercise:    30     mins  42348;     Neuromuscular Alicia:    8    mins  64504;    Therapeutic Activity:          mins  22557;     Gait Training:           mins  23422;     Ultrasound:          mins  97745;    Untimed:  Electrical Stimulation:         mins  76810 ( );  Mechanical Traction:         mins  50846;   Dry Needling              ___  mins   58807    Timed Treatment:   38   mins   Total Treatment:     38  mins    Monserrat Carroll PT, DPT, [facility]  Physical Therapist  [place] License #[id number]  Electronically signed by: Monserrat Carroll PT, 23, 7:51 AM EDT

## 2023-09-07 ENCOUNTER — OFFICE VISIT (OUTPATIENT)
Dept: ORTHOPEDIC SURGERY | Facility: CLINIC | Age: 73
End: 2023-09-07
Payer: MEDICARE

## 2023-09-07 VITALS — HEIGHT: 73 IN | BODY MASS INDEX: 24.52 KG/M2 | WEIGHT: 185 LBS | RESPIRATION RATE: 16 BRPM | TEMPERATURE: 96.3 F

## 2023-09-07 DIAGNOSIS — R52 PAIN: Primary | ICD-10-CM

## 2023-09-07 DIAGNOSIS — M17.11 PRIMARY OSTEOARTHRITIS OF RIGHT KNEE: ICD-10-CM

## 2023-09-07 RX ORDER — LIDOCAINE HYDROCHLORIDE 10 MG/ML
5 INJECTION, SOLUTION EPIDURAL; INFILTRATION; INTRACAUDAL; PERINEURAL
Status: COMPLETED | OUTPATIENT
Start: 2023-09-07 | End: 2023-09-07

## 2023-09-07 RX ORDER — METHYLPREDNISOLONE ACETATE 80 MG/ML
80 INJECTION, SUSPENSION INTRA-ARTICULAR; INTRALESIONAL; INTRAMUSCULAR; SOFT TISSUE
Status: COMPLETED | OUTPATIENT
Start: 2023-09-07 | End: 2023-09-07

## 2023-09-07 RX ORDER — CEPHALEXIN 500 MG/1
CAPSULE ORAL
Qty: 4 CAPSULE | Refills: 5 | Status: SHIPPED | OUTPATIENT
Start: 2023-09-07

## 2023-09-07 RX ADMIN — LIDOCAINE HYDROCHLORIDE 5 ML: 10 INJECTION, SOLUTION EPIDURAL; INFILTRATION; INTRACAUDAL; PERINEURAL at 08:48

## 2023-09-07 RX ADMIN — METHYLPREDNISOLONE ACETATE 80 MG: 80 INJECTION, SUSPENSION INTRA-ARTICULAR; INTRALESIONAL; INTRAMUSCULAR; SOFT TISSUE at 08:48

## 2023-09-07 NOTE — PROGRESS NOTES
"Everton Murguia : 1950 MRN: [id number] DATE: 2023    DIAGNOSIS: Annual follow up left total knee / right pain    SUBJECTIVE:Patient returns today for a 1 year follow up of left total knee replacement with worsening right knee pain.  Regards to the patient's left knee he reports he is doing well, states he has great range of motion, and denies any significant limitations from surgery.  Patient reports he does have some occasional numbness when he first gets out of bed in the morning but after he takes a few steps it tends to go away.  Patient does state that his right knee has been bothering him as of recent specifically to the medial side.  Patient states the pain is intermittent and is not constant.  He describes it as a moderate ache.  He denies any recent injury fall or trauma to the right knee.    OBJECTIVE:    Temp 96.3 °F (35.7 °C) (Temporal)   Resp 16   Ht 185.4 cm (72.99\")   Wt 83.9 kg (185 lb)   BMI 24.41 kg/m²   Family History   Problem Relation Age of Onset    Malig Hyperthermia Neg Hx      Past Medical History:   Diagnosis Date    Arthritis     Hypertension     Knee pain, bilateral     Polio     Thoracic disc disorder     Type 2 diabetes mellitus, with long-term current use of insulin      Past Surgical History:   Procedure Laterality Date    TOTAL KNEE ARTHROPLASTY Left 2022    Procedure: TOTAL KNEE ARTHROPLASTY WITH CORI ROBOT;  Surgeon: Heriberto Rodríguez MD;  Location: [facility];  Service: Orthopedics;  Laterality: Left;     Social History     Socioeconomic History    Marital status:    Tobacco Use    Smoking status: Never     Passive exposure: Never    Smokeless tobacco: Never   Vaping Use    Vaping Use: Never used   Substance and Sexual Activity    Alcohol use: Never    Drug use: Never     Review of Systems - a 14 point review of systems was performed. All systems were negative.    Exam:. (Left knee) the incision is well healed. Range of motion is measured at 0 to 120. The " calf is soft and nontender with a negative Homans sign. Alignment is neutral. Good quad strength. There is no evidence of varus/valgus or flexion instability. No effusion. Intact to light touch with palpable distal pulses.     Knee:  right    ALIGNMENT:     Varus  ,   Patella  tracks  midline    GAIT:    Antalgic    SKIN:    No abnormality    RANGE OF MOTION:   3  -  125   DEG    STRENGTH:   4  / 5    LIGAMENTS:    No varus / valgus instability.   Negative  Lachman.    MENISCUS:     Positive Li       DISTAL PULSES:    Paplable    DISTAL SENSATION :   Intact    LYMPHATICS:     No   lymphadenopathy    OTHER:          - Positive   effusion      - Crepitance with ROM       - Medial joint line tenderness      DIAGNOSTIC STUDIES  Xrays: 3 views of the left knee (AP, lateral, and sunrise) were ordered and reviewed for evaluation of recent knee replacement. They demonstrate a well positioned, well aligned knee replacement without complicating factors noted. In comparison with previous films there has been no change.  Patient does have advanced varus deformity with bone-on-bone articulation, subchondral sclerosis and cystic changes, as well as periarticular osteophytes present on the right knee as well as patellofemoral osteoarthritis.    ASSESSMENT: Annual follow up left knee replacement /primary osteoarthritis right knee    PLAN:      Treatment options as well as imaging results were discussed in detail with the patient.  In regards to the patient's left knee he can continue activities as tolerated and can follow back up with us on an as-needed basis.  In regards to the patient's right knee he has advanced osteoarthritis with bone-on-bone articulation.  Patient reports he responded well previously to steroid injections therefore we will give him a cortisone injection today.  He can follow back up with me on an as-needed basis.    Large Joint Arthrocentesis: R knee  Date/Time: 9/7/2023 8:48 AM  Consent given by:  patient  Site marked: site marked  Timeout: Immediately prior to procedure a time out was called to verify the correct patient, procedure, equipment, support staff and site/side marked as required   Supporting Documentation  Indications: pain and joint swelling   Procedure Details  Location: knee - R knee  Preparation: Patient was prepped and draped in the usual sterile fashion  Needle size: 22 G  Approach: anterolateral  Medications administered: 80 mg methylPREDNISolone acetate 80 MG/ML; 5 mL lidocaine PF 1% 1 %  Patient tolerance: patient tolerated the procedure well with no immediate complications      (3 mL of lidocaine was used for anesthetic purposes)    KENDRA Luevano  9/7/2023
